# Patient Record
Sex: FEMALE | Race: WHITE | Employment: PART TIME | URBAN - METROPOLITAN AREA
[De-identification: names, ages, dates, MRNs, and addresses within clinical notes are randomized per-mention and may not be internally consistent; named-entity substitution may affect disease eponyms.]

---

## 2018-01-25 ENCOUNTER — HOSPITAL ENCOUNTER (OUTPATIENT)
Age: 38
Discharge: HOME | End: 2018-01-25
Payer: COMMERCIAL

## 2018-01-25 LAB
ALBUMIN SERPL-MCNC: 4.7 G/DL (ref 3.5–5.2)
ALP BLD-CCNC: 50 U/L (ref 35–104)
ALT SERPL-CCNC: 14 U/L (ref 5–33)
AST SERPL-CCNC: 23 U/L
C-REACTIVE PROTEIN: 2.1 MG/L (ref 0–5)
CALCIUM SERPL-MCNC: 9.5 MG/DL (ref 8.6–10.4)
CREAT SERPL-MCNC: 0.63 MG/DL (ref 0.5–0.9)
GFR AFRICAN AMERICAN: >60 ML/MIN
GFR NON-AFRICAN AMERICAN: >60 ML/MIN
GFR SERPL CREATININE-BSD FRML MDRD: NORMAL ML/MIN/{1.73_M2}
GFR SERPL CREATININE-BSD FRML MDRD: NORMAL ML/MIN/{1.73_M2}
HCT VFR BLD CALC: 45.1 % (ref 36–46)
HEMOGLOBIN: 15 G/DL (ref 12–16)
MCH RBC QN AUTO: 29.1 PG (ref 26–34)
MCHC RBC AUTO-ENTMCNC: 33.2 G/DL (ref 31–37)
MCV RBC AUTO: 87.7 FL (ref 80–100)
NRBC AUTOMATED: NORMAL PER 100 WBC
PDW BLD-RTO: 12.7 % (ref 11.5–14.5)
PLATELET # BLD: 322 K/UL (ref 130–400)
PMV BLD AUTO: 8.6 FL (ref 6–12)
RBC # BLD: 5.15 M/UL (ref 4–5.2)
SEDIMENTATION RATE, ERYTHROCYTE: 10 MM (ref 0–20)
WBC # BLD: 4.9 K/UL (ref 3.5–11)

## 2018-01-25 PROCEDURE — 85027 COMPLETE CBC AUTOMATED: CPT

## 2018-01-25 PROCEDURE — 85651 RBC SED RATE NONAUTOMATED: CPT

## 2018-01-25 PROCEDURE — 84075 ASSAY ALKALINE PHOSPHATASE: CPT

## 2018-01-25 PROCEDURE — 84450 TRANSFERASE (AST) (SGOT): CPT

## 2018-01-25 PROCEDURE — 84460 ALANINE AMINO (ALT) (SGPT): CPT

## 2018-01-25 PROCEDURE — 86140 C-REACTIVE PROTEIN: CPT

## 2018-01-25 PROCEDURE — 82310 ASSAY OF CALCIUM: CPT

## 2018-01-25 PROCEDURE — 82040 ASSAY OF SERUM ALBUMIN: CPT

## 2018-01-25 PROCEDURE — 82565 ASSAY OF CREATININE: CPT

## 2018-01-25 PROCEDURE — 36415 COLL VENOUS BLD VENIPUNCTURE: CPT

## 2018-02-08 ENCOUNTER — OFFICE VISIT (OUTPATIENT)
Dept: FAMILY MEDICINE CLINIC | Age: 38
End: 2018-02-08
Payer: COMMERCIAL

## 2018-02-08 VITALS
HEIGHT: 64 IN | BODY MASS INDEX: 21.89 KG/M2 | HEART RATE: 106 BPM | WEIGHT: 128.2 LBS | TEMPERATURE: 98.5 F | DIASTOLIC BLOOD PRESSURE: 70 MMHG | SYSTOLIC BLOOD PRESSURE: 128 MMHG

## 2018-02-08 DIAGNOSIS — Z11.4 SCREENING FOR HIV (HUMAN IMMUNODEFICIENCY VIRUS): ICD-10-CM

## 2018-02-08 DIAGNOSIS — Z23 NEED FOR INFLUENZA VACCINATION: ICD-10-CM

## 2018-02-08 DIAGNOSIS — Z76.89 ENCOUNTER TO ESTABLISH CARE: ICD-10-CM

## 2018-02-08 DIAGNOSIS — Z23 NEED FOR TDAP VACCINATION: ICD-10-CM

## 2018-02-08 DIAGNOSIS — L40.50 PSORIATIC ARTHRITIS (HCC): Primary | ICD-10-CM

## 2018-02-08 DIAGNOSIS — E78.5 DYSLIPIDEMIA: ICD-10-CM

## 2018-02-08 PROCEDURE — 99213 OFFICE O/P EST LOW 20 MIN: CPT | Performed by: FAMILY MEDICINE

## 2018-02-08 ASSESSMENT — ENCOUNTER SYMPTOMS
COLOR CHANGE: 0
ABDOMINAL PAIN: 0
COUGH: 0
VOMITING: 0
SHORTNESS OF BREATH: 0
DIARRHEA: 0
NAUSEA: 0
CONSTIPATION: 0
TROUBLE SWALLOWING: 0

## 2018-02-08 ASSESSMENT — PATIENT HEALTH QUESTIONNAIRE - PHQ9
1. LITTLE INTEREST OR PLEASURE IN DOING THINGS: 0
2. FEELING DOWN, DEPRESSED OR HOPELESS: 0
SUM OF ALL RESPONSES TO PHQ9 QUESTIONS 1 & 2: 0
SUM OF ALL RESPONSES TO PHQ QUESTIONS 1-9: 0

## 2018-02-08 NOTE — PROGRESS NOTES
Attending Physician Statement  I  have discussed the care of Sandy Chiu including pertinent history and exam findings with the resident. I agree with the assessment, plan and orders as documented by the resident. /70 (Site: Left Arm, Position: Sitting, Cuff Size: Medium Adult) Comment: Manual  Pulse 106   Temp 98.5 °F (36.9 °C) (Temporal)   Ht 5' 3.78\" (1.62 m)   Wt 128 lb 3.2 oz (58.2 kg)   LMP 01/18/2018 (Within Days)   BMI 22.16 kg/m²    BP Readings from Last 3 Encounters:   02/08/18 128/70     Wt Readings from Last 3 Encounters:   02/08/18 128 lb 3.2 oz (58.2 kg)         1. Psoriatic arthritis ([address]     2. Encounter to establish care     3. Need for Tdap vaccination  Tdap (age 6y and older) IM (Apportable Extension)   4. Need for influenza vaccination  INFLUENZA, QUADV, 6 MO AND OLDER, IM, MDV, 0.5ML (FLULAVAL QUADV)   5. Dyslipidemia  Lipid, Fasting   6. Screening for HIV (human immunodeficiency virus)  HIV Screen     Chart reviewed and discussed care plan with resident. Health maintenance addressed. Follow up in three months. Call results to patient.       Juan Hernández DO 2/8/2018 4:29 PM
virus)  - HIV Screen; Future      Ashwini Maier received counseling on the following healthy behaviors: nutrition, exercise and medication adherence  Reviewed prior labs and health maintenance  Continue current medications, diet and exercise. Discussed use, benefit, and side effects of prescribed medications. Barriers to medication compliance addressed. Patient given educational materials - see patient instructions  Was a self-tracking handout given in paper form or via [employer]? No    Requested Prescriptions      No prescriptions requested or ordered in this encounter       All patient questions answered. Patient voiced understanding. Quality Measures    Body mass index is 22.16 kg/m². Elevated. Weight control planned discussed Healthy diet and regular exercise. BP: 128/70 (Manual) Blood pressure is normal. Treatment plan consists of No treatment change needed. No results found for: LDLCALC, LDLCHOLESTEROL, LDLDIRECT (goal LDL reduction with dx if diabetes is 50% LDL reduction)      PHQ Scores 2/8/2018   PHQ2 Score 0   PHQ9 Score 0     Interpretation of Total Score Depression Severity: 1-4 = Minimal depression, 5-9 = Mild depression, 10-14 = Moderate depression, 15-19 = Moderately severe depression, 20-27 = Severe depression    Requested Prescriptions      No prescriptions requested or ordered in this encounter       There are no discontinued medications. Ashwini Maier received counseling on the following healthy behaviors: nutrition, exercise and medication adherence    Patient given educational materials : see patient instruction     Discussed use, benefit, and side effects of prescribed medications. Barriers to medication compliance addressed. All patient questions answered. Pt voiced understanding. Return in about 3 months (around 5/8/2018) for psoriatic arthritis . Please note that this chart was generated using voice recognition Dragon dictation software.   Although every effort

## 2018-02-09 ENCOUNTER — HOSPITAL ENCOUNTER (OUTPATIENT)
Age: 38
Discharge: HOME | End: 2018-02-09
Payer: COMMERCIAL

## 2018-02-09 ENCOUNTER — TELEPHONE (OUTPATIENT)
Dept: FAMILY MEDICINE CLINIC | Age: 38
End: 2018-02-09

## 2018-02-09 DIAGNOSIS — E78.5 DYSLIPIDEMIA: ICD-10-CM

## 2018-02-09 DIAGNOSIS — Z11.4 SCREENING FOR HIV (HUMAN IMMUNODEFICIENCY VIRUS): ICD-10-CM

## 2018-02-09 LAB
CHOLESTEROL, FASTING: 246 MG/DL
CHOLESTEROL/HDL RATIO: 3.3
HDLC SERPL-MCNC: 75 MG/DL
HIV AG/AB: NONREACTIVE
LDL CHOLESTEROL: 153 MG/DL (ref 0–130)
TRIGLYCERIDE, FASTING: 88 MG/DL
VLDLC SERPL CALC-MCNC: ABNORMAL MG/DL (ref 1–30)

## 2018-02-09 PROCEDURE — 87389 HIV-1 AG W/HIV-1&-2 AB AG IA: CPT

## 2018-02-09 PROCEDURE — 90715 TDAP VACCINE 7 YRS/> IM: CPT | Performed by: FAMILY MEDICINE

## 2018-02-09 PROCEDURE — 90471 IMMUNIZATION ADMIN: CPT | Performed by: FAMILY MEDICINE

## 2018-02-09 PROCEDURE — 36415 COLL VENOUS BLD VENIPUNCTURE: CPT

## 2018-02-09 PROCEDURE — 80061 LIPID PANEL: CPT

## 2018-02-09 PROCEDURE — 90688 IIV4 VACCINE SPLT 0.5 ML IM: CPT | Performed by: FAMILY MEDICINE

## 2018-02-09 PROCEDURE — 90472 IMMUNIZATION ADMIN EACH ADD: CPT | Performed by: FAMILY MEDICINE

## 2018-02-23 ENCOUNTER — HOSPITAL ENCOUNTER (OUTPATIENT)
Age: 38
Setting detail: SPECIMEN
Discharge: HOME | End: 2018-02-23
Payer: COMMERCIAL

## 2018-02-23 ENCOUNTER — OFFICE VISIT (OUTPATIENT)
Dept: OBGYN CLINIC | Age: 38
End: 2018-02-23
Payer: COMMERCIAL

## 2018-02-23 VITALS
HEART RATE: 115 BPM | BODY MASS INDEX: 22.5 KG/M2 | WEIGHT: 127 LBS | DIASTOLIC BLOOD PRESSURE: 77 MMHG | SYSTOLIC BLOOD PRESSURE: 131 MMHG | HEIGHT: 63 IN

## 2018-02-23 DIAGNOSIS — L40.50 PSORIATIC ARTHRITIS (HCC): ICD-10-CM

## 2018-02-23 DIAGNOSIS — Z01.419 WOMEN'S ANNUAL ROUTINE GYNECOLOGICAL EXAMINATION: Primary | ICD-10-CM

## 2018-02-23 DIAGNOSIS — Z31.69 ENCOUNTER FOR PRECONCEPTION CONSULTATION: ICD-10-CM

## 2018-02-23 PROCEDURE — 99385 PREV VISIT NEW AGE 18-39: CPT | Performed by: OBSTETRICS & GYNECOLOGY

## 2018-02-23 RX ORDER — ALBUTEROL SULFATE 90 UG/1
2 AEROSOL, METERED RESPIRATORY (INHALATION) EVERY 6 HOURS PRN
COMMUNITY
End: 2018-03-26 | Stop reason: ALTCHOICE

## 2018-02-23 ASSESSMENT — ENCOUNTER SYMPTOMS
PHOTOPHOBIA: 0
COUGH: 0
ABDOMINAL PAIN: 0
HEARTBURN: 0
BACK PAIN: 1
BLURRED VISION: 0
SHORTNESS OF BREATH: 0

## 2018-02-23 NOTE — PROGRESS NOTES
children: N/A    Years of education: N/A     Occupational History    Not on file. Social History Main Topics    Smoking status: Never Smoker    Smokeless tobacco: Never Used    Alcohol use No    Drug use: No    Sexual activity: Not Currently     Partners: Male     Other Topics Concern    Not on file     Social History Narrative    No narrative on file       Family History   Problem Relation Age of Onset    Heart Attack Mother     Diabetes Father     Diabetes Brother     Heart Disease Maternal Grandmother     Diabetes Maternal Grandmother     No Known Problems Maternal Grandfather     Heart Attack Paternal Grandmother     Heart Attack Paternal Grandfather        Review of Systems:   Review of Systems   Constitutional: Negative for chills and fever. HENT: Negative for congestion and hearing loss. Eyes: Negative for blurred vision and photophobia. Respiratory: Negative for cough and shortness of breath. Cardiovascular: Negative for chest pain and palpitations. Gastrointestinal: Negative for abdominal pain and heartburn. Genitourinary: Negative for dysuria and hematuria. Musculoskeletal: Positive for back pain and joint pain. Neurological: Negative for dizziness and headaches. Psychiatric/Behavioral: Negative for depression. The patient is not nervous/anxious. Physical exam:  vitals:  Height   5  ft    3 in,  Weight    127 lbs,   131/77 BP  Gen: alert, no apparent distress  HEENT: No pathologic skin lesions noted,NC/AT,PERRL, normal midline nontender thyroid   Lung Exam: Clear to auscultation in all fields bilaterally, without wheezes,rales or rhonchi. Cardiac Exam: Normal sinus rhythm and rate, without murmurs, rubs or gallops appreciated. Breast Exam: Symmetric without pathological skin changes, nontender without discrete suspicious masses palpated, supraclavicular or axillary adenopathy or nipple discharge noted.   Abdominal Exam: Nontender to deep palpation without

## 2018-02-27 LAB
HPV SAMPLE: NORMAL
HPV SOURCE: NORMAL
HPV, GENOTYPE 16: NOT DETECTED
HPV, GENOTYPE 18: NOT DETECTED
HPV, HIGH RISK OTHER: NOT DETECTED
HPV, INTERPRETATION: NORMAL

## 2018-03-02 LAB — CYTOLOGY REPORT: NORMAL

## 2018-03-26 ENCOUNTER — INITIAL CONSULT (OUTPATIENT)
Dept: PERINATAL CARE | Age: 38
End: 2018-03-26
Payer: COMMERCIAL

## 2018-03-26 VITALS
HEART RATE: 88 BPM | BODY MASS INDEX: 23.74 KG/M2 | RESPIRATION RATE: 16 BRPM | DIASTOLIC BLOOD PRESSURE: 74 MMHG | WEIGHT: 134 LBS | SYSTOLIC BLOOD PRESSURE: 120 MMHG | HEIGHT: 63 IN | TEMPERATURE: 97.8 F

## 2018-03-26 DIAGNOSIS — J45.909 ASTHMA AFFECTING PREGNANCY, ANTEPARTUM: ICD-10-CM

## 2018-03-26 DIAGNOSIS — O35.8XX0 SUSPECTED DAMAGE TO FETUS FROM DISEASE IN MOTHER, ANTEPARTUM CONDITION, SINGLE OR UNSPECIFIED FETUS: ICD-10-CM

## 2018-03-26 DIAGNOSIS — Z31.69 GENERAL COUNSELING AND ADVICE FOR PROCREATIVE MANAGEMENT: Primary | ICD-10-CM

## 2018-03-26 DIAGNOSIS — O09.519 ADVANCED MATERNAL AGE, PRIMIGRAVIDA, ANTEPARTUM: ICD-10-CM

## 2018-03-26 DIAGNOSIS — O99.519 ASTHMA AFFECTING PREGNANCY, ANTEPARTUM: ICD-10-CM

## 2018-03-26 PROCEDURE — 99242 OFF/OP CONSLTJ NEW/EST SF 20: CPT | Performed by: OBSTETRICS & GYNECOLOGY

## 2018-03-26 RX ORDER — MONTELUKAST SODIUM 10 MG/1
10 TABLET ORAL NIGHTLY
COMMUNITY
Start: 2017-01-26 | End: 2018-09-04 | Stop reason: SDUPTHER

## 2018-04-20 ENCOUNTER — HOSPITAL ENCOUNTER (OUTPATIENT)
Age: 38
Discharge: HOME | End: 2018-04-20
Payer: COMMERCIAL

## 2018-04-20 LAB
AMOUNT GLUCOSE GIVEN: 75 G
GLUCOSE FASTING: 94 MG/DL (ref 65–99)
GLUCOSE TOLERANCE TEST 1 HOUR: 104 MG/DL (ref 65–184)
GLUCOSE TOLERANCE TEST 2 HOUR: 105 MG/DL (ref 65–139)

## 2018-04-20 PROCEDURE — 82951 GLUCOSE TOLERANCE TEST (GTT): CPT

## 2018-04-20 PROCEDURE — 36415 COLL VENOUS BLD VENIPUNCTURE: CPT

## 2018-05-31 ENCOUNTER — HOSPITAL ENCOUNTER (OUTPATIENT)
Age: 38
Discharge: HOME | End: 2018-05-31
Payer: COMMERCIAL

## 2018-05-31 LAB
ALBUMIN SERPL-MCNC: 4.4 G/DL (ref 3.5–5.2)
ALP BLD-CCNC: 54 U/L (ref 35–104)
ALT SERPL-CCNC: 11 U/L (ref 5–33)
AST SERPL-CCNC: 19 U/L
C-REACTIVE PROTEIN: 3.8 MG/L (ref 0–5)
CALCIUM SERPL-MCNC: 9.3 MG/DL (ref 8.6–10.4)
CREAT SERPL-MCNC: 0.71 MG/DL (ref 0.5–0.9)
GFR AFRICAN AMERICAN: >60 ML/MIN
GFR NON-AFRICAN AMERICAN: >60 ML/MIN
GFR SERPL CREATININE-BSD FRML MDRD: NORMAL ML/MIN/{1.73_M2}
GFR SERPL CREATININE-BSD FRML MDRD: NORMAL ML/MIN/{1.73_M2}
HCT VFR BLD CALC: 43.9 % (ref 36–46)
HEMOGLOBIN: 14.9 G/DL (ref 12–16)
MCH RBC QN AUTO: 29.6 PG (ref 26–34)
MCHC RBC AUTO-ENTMCNC: 34 G/DL (ref 31–37)
MCV RBC AUTO: 87.2 FL (ref 80–100)
NRBC AUTOMATED: NORMAL PER 100 WBC
PDW BLD-RTO: 12 % (ref 11.5–14.5)
PLATELET # BLD: 314 K/UL (ref 130–400)
PMV BLD AUTO: NORMAL FL (ref 6–12)
RBC # BLD: 5.04 M/UL (ref 4–5.2)
SEDIMENTATION RATE, ERYTHROCYTE: 16 MM (ref 0–20)
WBC # BLD: 6.2 K/UL (ref 3.5–11)

## 2018-05-31 PROCEDURE — 82565 ASSAY OF CREATININE: CPT

## 2018-05-31 PROCEDURE — 84460 ALANINE AMINO (ALT) (SGPT): CPT

## 2018-05-31 PROCEDURE — 84450 TRANSFERASE (AST) (SGOT): CPT

## 2018-05-31 PROCEDURE — 86140 C-REACTIVE PROTEIN: CPT

## 2018-05-31 PROCEDURE — 84075 ASSAY ALKALINE PHOSPHATASE: CPT

## 2018-05-31 PROCEDURE — 85027 COMPLETE CBC AUTOMATED: CPT

## 2018-05-31 PROCEDURE — 85651 RBC SED RATE NONAUTOMATED: CPT

## 2018-05-31 PROCEDURE — 36415 COLL VENOUS BLD VENIPUNCTURE: CPT

## 2018-05-31 PROCEDURE — 82310 ASSAY OF CALCIUM: CPT

## 2018-05-31 PROCEDURE — 82040 ASSAY OF SERUM ALBUMIN: CPT

## 2018-06-18 ENCOUNTER — OFFICE VISIT (OUTPATIENT)
Dept: FAMILY MEDICINE CLINIC | Age: 38
End: 2018-06-18
Payer: COMMERCIAL

## 2018-06-18 VITALS
BODY MASS INDEX: 23.6 KG/M2 | WEIGHT: 133.2 LBS | TEMPERATURE: 98.1 F | HEIGHT: 63 IN | SYSTOLIC BLOOD PRESSURE: 126 MMHG | DIASTOLIC BLOOD PRESSURE: 87 MMHG | HEART RATE: 87 BPM

## 2018-06-18 DIAGNOSIS — Z02.0 SCHOOL PHYSICAL EXAM: Primary | ICD-10-CM

## 2018-06-18 DIAGNOSIS — J45.909 UNCOMPLICATED ASTHMA, UNSPECIFIED ASTHMA SEVERITY, UNSPECIFIED WHETHER PERSISTENT: ICD-10-CM

## 2018-06-18 DIAGNOSIS — Z23 NEED FOR PNEUMOCOCCAL VACCINATION: ICD-10-CM

## 2018-06-18 PROCEDURE — 99213 OFFICE O/P EST LOW 20 MIN: CPT | Performed by: FAMILY MEDICINE

## 2018-06-18 PROCEDURE — 90732 PPSV23 VACC 2 YRS+ SUBQ/IM: CPT | Performed by: FAMILY MEDICINE

## 2018-06-18 RX ORDER — ALBUTEROL SULFATE 90 UG/1
1 AEROSOL, METERED RESPIRATORY (INHALATION) 2 TIMES DAILY
Qty: 1 INHALER | Refills: 3 | Status: SHIPPED | OUTPATIENT
Start: 2018-06-18 | End: 2019-02-27 | Stop reason: SDUPTHER

## 2018-06-18 ASSESSMENT — ENCOUNTER SYMPTOMS
DIARRHEA: 0
TROUBLE SWALLOWING: 0
SHORTNESS OF BREATH: 0
VOMITING: 0
NAUSEA: 0
COUGH: 0
COLOR CHANGE: 0
CONSTIPATION: 0
ABDOMINAL PAIN: 0

## 2018-06-19 ENCOUNTER — HOSPITAL ENCOUNTER (OUTPATIENT)
Age: 38
Discharge: HOME | End: 2018-06-19
Payer: COMMERCIAL

## 2018-06-19 DIAGNOSIS — Z02.0 SCHOOL PHYSICAL EXAM: ICD-10-CM

## 2018-06-19 LAB
HBV SURFACE AB TITR SER: 9.76 MIU/ML
RUBV IGG SER QL: >500 IU/ML

## 2018-06-19 PROCEDURE — 86765 RUBEOLA ANTIBODY: CPT

## 2018-06-19 PROCEDURE — 86762 RUBELLA ANTIBODY: CPT

## 2018-06-19 PROCEDURE — 86480 TB TEST CELL IMMUN MEASURE: CPT

## 2018-06-19 PROCEDURE — 86735 MUMPS ANTIBODY: CPT

## 2018-06-19 PROCEDURE — 86317 IMMUNOASSAY INFECTIOUS AGENT: CPT

## 2018-06-19 PROCEDURE — 36415 COLL VENOUS BLD VENIPUNCTURE: CPT

## 2018-06-19 PROCEDURE — 86787 VARICELLA-ZOSTER ANTIBODY: CPT

## 2018-06-21 LAB
QUANTIFERON (R) TB GOLD (INCUBATED): NEGATIVE
QUANTIFERON MITOGEN: >10 IU/ML
QUANTIFERON NIL: 0.02 IU/ML
QUANTIFERON TB AG MINUS NIL: 0 IU/ML (ref 0–0.34)

## 2018-06-22 LAB
MEASLES IMMUNE (IGG): 1.97
MUV IGG SER QL: 0.88
VZV IGG SER QL IA: 1.35

## 2018-06-25 ENCOUNTER — TELEPHONE (OUTPATIENT)
Dept: ADMINISTRATIVE | Age: 38
End: 2018-06-25

## 2018-07-09 ENCOUNTER — NURSE ONLY (OUTPATIENT)
Dept: FAMILY MEDICINE CLINIC | Age: 38
End: 2018-07-09
Payer: COMMERCIAL

## 2018-07-09 DIAGNOSIS — Z23 IMMUNIZATION DUE: Primary | ICD-10-CM

## 2018-07-09 PROCEDURE — 90746 HEPB VACCINE 3 DOSE ADULT IM: CPT | Performed by: FAMILY MEDICINE

## 2018-07-09 NOTE — PROGRESS NOTES
Patient came to office to get mmr and hep b. I administered hep b in right arm. I did not give mmr due to the patient did not have proof from rheum doctor that she can have it. Patient said she will get proof and come back to office.

## 2018-07-11 ENCOUNTER — TELEPHONE (OUTPATIENT)
Dept: ADMINISTRATIVE | Age: 38
End: 2018-07-11

## 2018-07-11 NOTE — TELEPHONE ENCOUNTER
Writer spoke with patient , Clearance was received , scanned into pts chart.  Pt made an appointment to received MMR vaccine on 07/16/2018 @3:45pm.

## 2018-07-11 NOTE — TELEPHONE ENCOUNTER
Patient came into have vaccinations done, but she states they weren't done because office needed approval from patient's rheumatologist, Dr. Franky Mace. Patient wants to know if you have received approval so she can get rescheduled for her vaccinations. She states Dr. Sonia Naylor office was supposed to have sent approval already. Please call pt back, she is waiting on return call so she can schedule appt.

## 2018-07-16 ENCOUNTER — NURSE ONLY (OUTPATIENT)
Dept: FAMILY MEDICINE CLINIC | Age: 38
End: 2018-07-16
Payer: COMMERCIAL

## 2018-07-16 DIAGNOSIS — Z23 NEED FOR MMR VACCINE: Primary | ICD-10-CM

## 2018-07-16 PROCEDURE — 90707 MMR VACCINE SC: CPT | Performed by: FAMILY MEDICINE

## 2018-07-16 PROCEDURE — 99211 OFF/OP EST MAY X REQ PHY/QHP: CPT | Performed by: FAMILY MEDICINE

## 2018-07-16 NOTE — PROGRESS NOTES
Pt here today for MMR vaccine Pt tolerate well, injection of the LT arm Pt was informed that record of vaccination and paperwork will be faxed to the appropriate office.

## 2018-08-06 ENCOUNTER — OFFICE VISIT (OUTPATIENT)
Dept: FAMILY MEDICINE CLINIC | Age: 38
End: 2018-08-06
Payer: COMMERCIAL

## 2018-08-06 VITALS
TEMPERATURE: 97.5 F | HEIGHT: 63 IN | DIASTOLIC BLOOD PRESSURE: 78 MMHG | HEART RATE: 95 BPM | WEIGHT: 136.6 LBS | BODY MASS INDEX: 24.2 KG/M2 | SYSTOLIC BLOOD PRESSURE: 128 MMHG

## 2018-08-06 DIAGNOSIS — J01.00 ACUTE NON-RECURRENT MAXILLARY SINUSITIS: Primary | ICD-10-CM

## 2018-08-06 PROCEDURE — 99213 OFFICE O/P EST LOW 20 MIN: CPT | Performed by: HOSPITALIST

## 2018-08-06 RX ORDER — AZITHROMYCIN 250 MG/1
TABLET, FILM COATED ORAL
Qty: 1 PACKET | Refills: 0 | Status: SHIPPED | OUTPATIENT
Start: 2018-08-06 | End: 2018-08-10

## 2018-08-06 ASSESSMENT — ENCOUNTER SYMPTOMS
SORE THROAT: 1
VOMITING: 0
COUGH: 0
SINUS PRESSURE: 1
WHEEZING: 0
SHORTNESS OF BREATH: 0
HOARSE VOICE: 1
NAUSEA: 0

## 2018-08-06 ASSESSMENT — PATIENT HEALTH QUESTIONNAIRE - PHQ9
1. LITTLE INTEREST OR PLEASURE IN DOING THINGS: 0
2. FEELING DOWN, DEPRESSED OR HOPELESS: 0
SUM OF ALL RESPONSES TO PHQ QUESTIONS 1-9: 0
SUM OF ALL RESPONSES TO PHQ9 QUESTIONS 1 & 2: 0

## 2018-08-06 NOTE — PATIENT INSTRUCTIONS
Thank you for letting us take care of you today. We hope all your questions were addressed. If a question was overlooked or something else comes to mind after you return home, please contact a member of your Care Team listed below. Please make sure you have a routine office visit set up to follow-up on [address]. Your Care Team at [place] [address] is Team #2  Aurelio Doherty DO (Faculty)  Tiffanie Bear MD (Resident)  Serena Martinez MD (Resident)  James Cuello MD (Resident)  Moon Lowe MD (Resident)  Ki Duarte MD (Resident)  Stefanie Rubio, LPN  Anayeli Force., Evelyn Summerssusan, [address] ([facility])  Madyson Isaacs RN, ([address] [facility])  Karolina Hoskins, Ph.D., (Behavioral Services)  Warrenjimmy Norm, [address] [place] [address] (Clinical Pharmacist)     Office phone number: [phone]    If you need to get in right away due to illness, please be advised we have \"Same Day\" appointments available Monday-Friday. Please call us at [phone] option #3 to schedule your \"Same Day\" appointment. Patient Education        Sinusitis: Care Instructions  Your Care Instructions    Sinusitis is an infection of the lining of the sinus cavities in your head. Sinusitis often follows a cold. It causes pain and pressure in your head and face. In most cases, sinusitis gets better on its own in 1 to 2 weeks. But some mild symptoms may last for several weeks. Sometimes antibiotics are needed. Follow-up care is a key part of your treatment and safety. Be sure to make and go to all appointments, and call your doctor if you are having problems. It's also a good idea to know your test results and keep a list of the medicines you take. How can you care for yourself at home? · Take an over-the-counter pain medicine, such as acetaminophen (Tylenol), ibuprofen (Advil, Motrin), or naproxen (Aleve). Read and follow all instructions on the label. · If the doctor prescribed antibiotics, take them as directed.

## 2018-08-13 ENCOUNTER — NURSE ONLY (OUTPATIENT)
Dept: FAMILY MEDICINE CLINIC | Age: 38
End: 2018-08-13
Payer: COMMERCIAL

## 2018-08-13 DIAGNOSIS — Z23 NEED FOR VACCINATION: Primary | ICD-10-CM

## 2018-08-13 PROCEDURE — G0010 ADMIN HEPATITIS B VACCINE: HCPCS

## 2018-08-13 PROCEDURE — 90707 MMR VACCINE SC: CPT

## 2018-08-13 NOTE — PROGRESS NOTES
Patient here today for nurse visit for MMR immunization. Administered injection in right deltoid. Patient tolerated procedure well. VIS given. Patient here today for nurse visit for Hepatitis B injection #2 of 3. Administered injection in right deltoid. Patient tolerated procedure well. VIS given.   To follow up in 6 months for Hepatitis B injection #3

## 2018-09-04 RX ORDER — MONTELUKAST SODIUM 10 MG/1
10 TABLET ORAL NIGHTLY
Qty: 30 TABLET | Refills: 3 | Status: SHIPPED | OUTPATIENT
Start: 2018-09-04 | End: 2019-07-22 | Stop reason: SDUPTHER

## 2018-09-04 NOTE — TELEPHONE ENCOUNTER
Please refill [facility]          Next Visit Date:  No future appointments.     Health Maintenance   Topic Date Due    Flu vaccine (1) 09/01/2018    Cervical cancer screen  02/23/2023    DTaP/Tdap/Td vaccine (2 - Td) 02/09/2028    Pneumococcal med risk  Completed    HIV screen  Completed       No results found for: LABA1C          ( goal A1C is < 7)   No results found for: LABMICR  LDL Cholesterol (mg/dL)   Date Value   02/09/2018 153 (H)       (goal LDL is <100)   AST (U/L)   Date Value   05/31/2018 19     ALT (U/L)   Date Value   05/31/2018 11     BP Readings from Last 3 Encounters:   08/06/18 128/78   06/18/18 126/87   03/26/18 120/74          (goal 120/80)    All Future Testing planned in [facility]  Lab Frequency Next Occurrence   PAP Smear Once 03/23/2018               Patient Active Problem List:     General counseling and advice for procreative management     Asthma affecting pregnancy, antepartum     Suspected damage to fetus from other disease in mother, affecting management of mother, antepartum condition or complication     Advanced maternal age, primigravida, antepartum

## 2018-09-21 ENCOUNTER — HOSPITAL ENCOUNTER (OUTPATIENT)
Age: 38
Discharge: HOME | End: 2018-09-21
Payer: COMMERCIAL

## 2018-09-21 LAB
ABSOLUTE EOS #: 0.5 K/UL (ref 0–0.4)
ABSOLUTE IMMATURE GRANULOCYTE: ABNORMAL K/UL (ref 0–0.3)
ABSOLUTE LYMPH #: 1.6 K/UL (ref 1–4.8)
ABSOLUTE MONO #: 0.6 K/UL (ref 0.2–0.8)
ALBUMIN SERPL-MCNC: 4.2 G/DL (ref 3.5–5.2)
ALP BLD-CCNC: 53 U/L (ref 35–104)
ALT SERPL-CCNC: 12 U/L (ref 5–33)
AST SERPL-CCNC: 24 U/L
BASOPHILS # BLD: 1 % (ref 0–2)
BASOPHILS ABSOLUTE: 0.1 K/UL (ref 0–0.2)
C-REACTIVE PROTEIN: 4.2 MG/L (ref 0–5)
CALCIUM SERPL-MCNC: 9.2 MG/DL (ref 8.6–10.4)
CREAT SERPL-MCNC: 0.62 MG/DL (ref 0.5–0.9)
DIFFERENTIAL TYPE: ABNORMAL
EOSINOPHILS RELATIVE PERCENT: 7 % (ref 1–4)
GFR AFRICAN AMERICAN: >60 ML/MIN
GFR NON-AFRICAN AMERICAN: >60 ML/MIN
GFR SERPL CREATININE-BSD FRML MDRD: NORMAL ML/MIN/{1.73_M2}
GFR SERPL CREATININE-BSD FRML MDRD: NORMAL ML/MIN/{1.73_M2}
HCT VFR BLD CALC: 43.9 % (ref 36–46)
HEMOGLOBIN: 14.4 G/DL (ref 12–16)
IMMATURE GRANULOCYTES: ABNORMAL %
LYMPHOCYTES # BLD: 24 % (ref 24–44)
MCH RBC QN AUTO: 28.5 PG (ref 26–34)
MCHC RBC AUTO-ENTMCNC: 32.8 G/DL (ref 31–37)
MCV RBC AUTO: 86.7 FL (ref 80–100)
MONOCYTES # BLD: 9 % (ref 1–7)
NRBC AUTOMATED: ABNORMAL PER 100 WBC
PDW BLD-RTO: 12.2 % (ref 11.5–14.5)
PLATELET # BLD: 326 K/UL (ref 130–400)
PLATELET ESTIMATE: ABNORMAL
PMV BLD AUTO: ABNORMAL FL (ref 6–12)
RBC # BLD: 5.07 M/UL (ref 4–5.2)
RBC # BLD: ABNORMAL 10*6/UL
SEDIMENTATION RATE, ERYTHROCYTE: 20 MM (ref 0–20)
SEG NEUTROPHILS: 59 % (ref 36–66)
SEGMENTED NEUTROPHILS ABSOLUTE COUNT: 3.8 K/UL (ref 1.8–7.7)
WBC # BLD: 6.6 K/UL (ref 3.5–11)
WBC # BLD: ABNORMAL 10*3/UL

## 2018-09-21 PROCEDURE — 36415 COLL VENOUS BLD VENIPUNCTURE: CPT

## 2018-09-21 PROCEDURE — 86140 C-REACTIVE PROTEIN: CPT

## 2018-09-21 PROCEDURE — 85025 COMPLETE CBC W/AUTO DIFF WBC: CPT

## 2018-09-21 PROCEDURE — 84460 ALANINE AMINO (ALT) (SGPT): CPT

## 2018-09-21 PROCEDURE — 84450 TRANSFERASE (AST) (SGOT): CPT

## 2018-09-21 PROCEDURE — 85651 RBC SED RATE NONAUTOMATED: CPT

## 2018-09-21 PROCEDURE — 82310 ASSAY OF CALCIUM: CPT

## 2018-09-21 PROCEDURE — 82565 ASSAY OF CREATININE: CPT

## 2018-09-21 PROCEDURE — 84075 ASSAY ALKALINE PHOSPHATASE: CPT

## 2018-09-21 PROCEDURE — 82040 ASSAY OF SERUM ALBUMIN: CPT

## 2018-11-20 ENCOUNTER — HOSPITAL ENCOUNTER (OUTPATIENT)
Age: 38
Discharge: HOME | End: 2018-11-20
Payer: COMMERCIAL

## 2018-11-20 ENCOUNTER — OFFICE VISIT (OUTPATIENT)
Dept: FAMILY MEDICINE CLINIC | Age: 38
End: 2018-11-20
Payer: COMMERCIAL

## 2018-11-20 VITALS
HEART RATE: 60 BPM | BODY MASS INDEX: 24.98 KG/M2 | DIASTOLIC BLOOD PRESSURE: 80 MMHG | TEMPERATURE: 97.4 F | SYSTOLIC BLOOD PRESSURE: 110 MMHG | WEIGHT: 141 LBS

## 2018-11-20 DIAGNOSIS — J06.9 VIRAL URI: Primary | ICD-10-CM

## 2018-11-20 DIAGNOSIS — R06.02 SOB (SHORTNESS OF BREATH): ICD-10-CM

## 2018-11-20 DIAGNOSIS — R06.2 WHEEZING: ICD-10-CM

## 2018-11-20 LAB
ABSOLUTE EOS #: 0.73 K/UL (ref 0–0.44)
ABSOLUTE IMMATURE GRANULOCYTE: 0.03 K/UL (ref 0–0.3)
ABSOLUTE LYMPH #: 1.18 K/UL (ref 1.1–3.7)
ABSOLUTE MONO #: 1.15 K/UL (ref 0.1–1.2)
ALBUMIN SERPL-MCNC: 4.4 G/DL (ref 3.5–5.2)
ALP BLD-CCNC: 64 U/L (ref 35–104)
ALT SERPL-CCNC: 13 U/L (ref 5–33)
AST SERPL-CCNC: 24 U/L
BASOPHILS # BLD: 1 % (ref 0–2)
BASOPHILS ABSOLUTE: 0.07 K/UL (ref 0–0.2)
C-REACTIVE PROTEIN: 15.4 MG/L (ref 0–5)
CALCIUM SERPL-MCNC: 9.1 MG/DL (ref 8.6–10.4)
CREAT SERPL-MCNC: 0.75 MG/DL (ref 0.5–0.9)
DIFFERENTIAL TYPE: ABNORMAL
EOSINOPHILS RELATIVE PERCENT: 11 % (ref 1–4)
GFR AFRICAN AMERICAN: >60 ML/MIN
GFR NON-AFRICAN AMERICAN: >60 ML/MIN
GFR SERPL CREATININE-BSD FRML MDRD: NORMAL ML/MIN/{1.73_M2}
GFR SERPL CREATININE-BSD FRML MDRD: NORMAL ML/MIN/{1.73_M2}
HCT VFR BLD CALC: 45.4 % (ref 36.3–47.1)
HEMOGLOBIN: 14.8 G/DL (ref 11.9–15.1)
IMMATURE GRANULOCYTES: 0 %
LYMPHOCYTES # BLD: 18 % (ref 24–43)
MCH RBC QN AUTO: 28.8 PG (ref 25.2–33.5)
MCHC RBC AUTO-ENTMCNC: 32.6 G/DL (ref 28.4–34.8)
MCV RBC AUTO: 88.3 FL (ref 82.6–102.9)
MONOCYTES # BLD: 17 % (ref 3–12)
NRBC AUTOMATED: 0 PER 100 WBC
PDW BLD-RTO: 12.5 % (ref 11.8–14.4)
PLATELET # BLD: 281 K/UL (ref 138–453)
PLATELET ESTIMATE: ABNORMAL
PMV BLD AUTO: 11.2 FL (ref 8.1–13.5)
RBC # BLD: 5.14 M/UL (ref 3.95–5.11)
RBC # BLD: ABNORMAL 10*6/UL
SEDIMENTATION RATE, ERYTHROCYTE: 20 MM (ref 0–20)
SEG NEUTROPHILS: 53 % (ref 36–65)
SEGMENTED NEUTROPHILS ABSOLUTE COUNT: 3.54 K/UL (ref 1.5–8.1)
WBC # BLD: 6.7 K/UL (ref 3.5–11.3)
WBC # BLD: ABNORMAL 10*3/UL

## 2018-11-20 PROCEDURE — 82565 ASSAY OF CREATININE: CPT

## 2018-11-20 PROCEDURE — 36415 COLL VENOUS BLD VENIPUNCTURE: CPT

## 2018-11-20 PROCEDURE — 82310 ASSAY OF CALCIUM: CPT

## 2018-11-20 PROCEDURE — 84460 ALANINE AMINO (ALT) (SGPT): CPT

## 2018-11-20 PROCEDURE — 82040 ASSAY OF SERUM ALBUMIN: CPT

## 2018-11-20 PROCEDURE — 84075 ASSAY ALKALINE PHOSPHATASE: CPT

## 2018-11-20 PROCEDURE — 99213 OFFICE O/P EST LOW 20 MIN: CPT | Performed by: STUDENT IN AN ORGANIZED HEALTH CARE EDUCATION/TRAINING PROGRAM

## 2018-11-20 PROCEDURE — 85025 COMPLETE CBC W/AUTO DIFF WBC: CPT

## 2018-11-20 PROCEDURE — 86140 C-REACTIVE PROTEIN: CPT

## 2018-11-20 PROCEDURE — 85651 RBC SED RATE NONAUTOMATED: CPT

## 2018-11-20 PROCEDURE — 84450 TRANSFERASE (AST) (SGOT): CPT

## 2018-11-20 RX ORDER — ECHINACEA PURPUREA EXTRACT 125 MG
1 TABLET ORAL PRN
Qty: 1 BOTTLE | Refills: 3 | Status: SHIPPED | OUTPATIENT
Start: 2018-11-20 | End: 2019-01-10 | Stop reason: ALTCHOICE

## 2018-11-20 ASSESSMENT — ENCOUNTER SYMPTOMS
CHEST TIGHTNESS: 1
WHEEZING: 1
SHORTNESS OF BREATH: 1
CONSTIPATION: 0
NAUSEA: 0
CHOKING: 0
ABDOMINAL PAIN: 0
COUGH: 1
STRIDOR: 0
APNEA: 0
DIARRHEA: 0

## 2018-11-20 NOTE — PROGRESS NOTES
Attending Physician Statement    Wt Readings from Last 3 Encounters:   11/20/18 141 lb (64 kg)   08/06/18 136 lb 9.6 oz (62 kg)   06/18/18 133 lb 3.2 oz (60.4 kg)     Temp Readings from Last 3 Encounters:   11/20/18 97.4 °F (36.3 °C) (Oral)   08/06/18 97.5 °F (36.4 °C)   06/18/18 98.1 °F (36.7 °C) (Temporal)     BP Readings from Last 3 Encounters:   11/20/18 110/80   08/06/18 128/78   06/18/18 126/87     Pulse Readings from Last 3 Encounters:   11/20/18 60   08/06/18 95   06/18/18 87         I have discussed the care of Jennifer Chacon, including pertinent history and exam findings,  with the resident. I have reviewed the key elements of all parts of the encounter with the resident. I agree with the assessment, plan and orders as documented by the resident.   (GE Modifier)

## 2018-12-06 ENCOUNTER — HOSPITAL ENCOUNTER (OUTPATIENT)
Dept: PULMONOLOGY | Age: 38
Discharge: HOME | End: 2018-12-06
Payer: COMMERCIAL

## 2018-12-06 DIAGNOSIS — R06.2 WHEEZING: ICD-10-CM

## 2018-12-06 DIAGNOSIS — R06.02 SOB (SHORTNESS OF BREATH): ICD-10-CM

## 2018-12-06 PROCEDURE — 94664 DEMO&/EVAL PT USE INHALER: CPT

## 2018-12-06 PROCEDURE — 94060 EVALUATION OF WHEEZING: CPT

## 2018-12-06 PROCEDURE — 94726 PLETHYSMOGRAPHY LUNG VOLUMES: CPT

## 2018-12-06 PROCEDURE — 94640 AIRWAY INHALATION TREATMENT: CPT

## 2018-12-06 PROCEDURE — 94250 HC DIFFUSION: CPT

## 2018-12-06 PROCEDURE — 94727 GAS DIL/WSHOT DETER LNG VOL: CPT

## 2018-12-14 ENCOUNTER — TELEPHONE (OUTPATIENT)
Dept: FAMILY MEDICINE CLINIC | Age: 38
End: 2018-12-14

## 2019-01-07 ENCOUNTER — TELEPHONE (OUTPATIENT)
Dept: PHARMACY | Age: 39
End: 2019-01-07

## 2019-01-10 ENCOUNTER — NURSE ONLY (OUTPATIENT)
Dept: FAMILY MEDICINE CLINIC | Age: 39
End: 2019-01-10
Payer: COMMERCIAL

## 2019-01-10 ENCOUNTER — TELEPHONE (OUTPATIENT)
Dept: PHARMACY | Age: 39
End: 2019-01-10

## 2019-01-10 DIAGNOSIS — Z23 NEED FOR HEPATITIS B VACCINATION: Primary | ICD-10-CM

## 2019-01-10 PROCEDURE — 90471 IMMUNIZATION ADMIN: CPT | Performed by: FAMILY MEDICINE

## 2019-01-10 PROCEDURE — 90746 HEPB VACCINE 3 DOSE ADULT IM: CPT | Performed by: FAMILY MEDICINE

## 2019-01-10 RX ORDER — CELECOXIB 200 MG/1
200 CAPSULE ORAL 2 TIMES DAILY PRN
COMMUNITY

## 2019-01-18 ENCOUNTER — OFFICE VISIT (OUTPATIENT)
Dept: INTERNAL MEDICINE | Age: 39
End: 2019-01-18
Payer: COMMERCIAL

## 2019-01-18 ENCOUNTER — HOSPITAL ENCOUNTER (OUTPATIENT)
Age: 39
Discharge: HOME | End: 2019-01-18
Payer: COMMERCIAL

## 2019-01-18 VITALS
DIASTOLIC BLOOD PRESSURE: 78 MMHG | WEIGHT: 147 LBS | HEIGHT: 63 IN | BODY MASS INDEX: 26.05 KG/M2 | HEART RATE: 77 BPM | SYSTOLIC BLOOD PRESSURE: 120 MMHG

## 2019-01-18 DIAGNOSIS — L40.50 PSORIATIC ARTHRITIS (HCC): ICD-10-CM

## 2019-01-18 LAB
ABSOLUTE EOS #: 0.41 K/UL (ref 0–0.44)
ABSOLUTE IMMATURE GRANULOCYTE: 0.03 K/UL (ref 0–0.3)
ABSOLUTE LYMPH #: 1.55 K/UL (ref 1.1–3.7)
ABSOLUTE MONO #: 0.47 K/UL (ref 0.1–1.2)
ALBUMIN SERPL-MCNC: 4.2 G/DL (ref 3.5–5.2)
ALP BLD-CCNC: 56 U/L (ref 35–104)
ALT SERPL-CCNC: 11 U/L (ref 5–33)
AST SERPL-CCNC: 20 U/L
BASOPHILS # BLD: 1 % (ref 0–2)
BASOPHILS ABSOLUTE: 0.04 K/UL (ref 0–0.2)
C-REACTIVE PROTEIN: 7.7 MG/L (ref 0–5)
CALCIUM SERPL-MCNC: 9 MG/DL (ref 8.6–10.4)
CREAT SERPL-MCNC: 0.81 MG/DL (ref 0.5–0.9)
DIFFERENTIAL TYPE: ABNORMAL
EOSINOPHILS RELATIVE PERCENT: 7 % (ref 1–4)
GFR AFRICAN AMERICAN: >60 ML/MIN
GFR NON-AFRICAN AMERICAN: >60 ML/MIN
GFR SERPL CREATININE-BSD FRML MDRD: NORMAL ML/MIN/{1.73_M2}
GFR SERPL CREATININE-BSD FRML MDRD: NORMAL ML/MIN/{1.73_M2}
HCT VFR BLD CALC: 42.8 % (ref 36.3–47.1)
HEMOGLOBIN: 14.4 G/DL (ref 11.9–15.1)
IMMATURE GRANULOCYTES: 1 %
LYMPHOCYTES # BLD: 27 % (ref 24–43)
MCH RBC QN AUTO: 29.5 PG (ref 25.2–33.5)
MCHC RBC AUTO-ENTMCNC: 33.6 G/DL (ref 28.4–34.8)
MCV RBC AUTO: 87.7 FL (ref 82.6–102.9)
MONOCYTES # BLD: 8 % (ref 3–12)
NRBC AUTOMATED: 0 PER 100 WBC
PDW BLD-RTO: 12.2 % (ref 11.8–14.4)
PLATELET # BLD: 319 K/UL (ref 138–453)
PLATELET ESTIMATE: ABNORMAL
PMV BLD AUTO: 11 FL (ref 8.1–13.5)
RBC # BLD: 4.88 M/UL (ref 3.95–5.11)
RBC # BLD: ABNORMAL 10*6/UL
SEDIMENTATION RATE, ERYTHROCYTE: 30 MM (ref 0–20)
SEG NEUTROPHILS: 56 % (ref 36–65)
SEGMENTED NEUTROPHILS ABSOLUTE COUNT: 3.21 K/UL (ref 1.5–8.1)
WBC # BLD: 5.7 K/UL (ref 3.5–11.3)
WBC # BLD: ABNORMAL 10*3/UL

## 2019-01-18 PROCEDURE — 99211 OFF/OP EST MAY X REQ PHY/QHP: CPT | Performed by: INTERNAL MEDICINE

## 2019-01-18 PROCEDURE — PHARMNEW PHARMACY SPECIALTY VISIT NEW: Performed by: INTERNAL MEDICINE

## 2019-01-18 PROCEDURE — 84460 ALANINE AMINO (ALT) (SGPT): CPT

## 2019-01-18 PROCEDURE — 36415 COLL VENOUS BLD VENIPUNCTURE: CPT

## 2019-01-18 PROCEDURE — 82310 ASSAY OF CALCIUM: CPT

## 2019-01-18 PROCEDURE — 85025 COMPLETE CBC W/AUTO DIFF WBC: CPT

## 2019-01-18 PROCEDURE — 82040 ASSAY OF SERUM ALBUMIN: CPT

## 2019-01-18 PROCEDURE — 84075 ASSAY ALKALINE PHOSPHATASE: CPT

## 2019-01-18 PROCEDURE — 82565 ASSAY OF CREATININE: CPT

## 2019-01-18 PROCEDURE — 85651 RBC SED RATE NONAUTOMATED: CPT

## 2019-01-18 PROCEDURE — 86140 C-REACTIVE PROTEIN: CPT

## 2019-01-18 PROCEDURE — 84450 TRANSFERASE (AST) (SGOT): CPT

## 2019-01-21 ENCOUNTER — OFFICE VISIT (OUTPATIENT)
Dept: FAMILY MEDICINE CLINIC | Age: 39
End: 2019-01-21
Payer: COMMERCIAL

## 2019-01-21 VITALS
TEMPERATURE: 97 F | HEART RATE: 98 BPM | BODY MASS INDEX: 25.91 KG/M2 | HEIGHT: 63 IN | SYSTOLIC BLOOD PRESSURE: 121 MMHG | DIASTOLIC BLOOD PRESSURE: 80 MMHG | WEIGHT: 146.2 LBS

## 2019-01-21 DIAGNOSIS — J30.9 ALLERGIC RHINITIS, UNSPECIFIED SEASONALITY, UNSPECIFIED TRIGGER: ICD-10-CM

## 2019-01-21 DIAGNOSIS — R05.9 COUGH: Primary | ICD-10-CM

## 2019-01-21 DIAGNOSIS — K59.00 CONSTIPATION, UNSPECIFIED CONSTIPATION TYPE: ICD-10-CM

## 2019-01-21 DIAGNOSIS — R53.83 FATIGUE, UNSPECIFIED TYPE: ICD-10-CM

## 2019-01-21 DIAGNOSIS — R63.5 WEIGHT GAIN: ICD-10-CM

## 2019-01-21 DIAGNOSIS — Z23 NEED FOR INFLUENZA VACCINATION: ICD-10-CM

## 2019-01-21 PROCEDURE — 90688 IIV4 VACCINE SPLT 0.5 ML IM: CPT | Performed by: HOSPITALIST

## 2019-01-21 PROCEDURE — 99213 OFFICE O/P EST LOW 20 MIN: CPT | Performed by: FAMILY MEDICINE

## 2019-01-21 PROCEDURE — 99211 OFF/OP EST MAY X REQ PHY/QHP: CPT | Performed by: FAMILY MEDICINE

## 2019-01-21 RX ORDER — CETIRIZINE HYDROCHLORIDE 10 MG/1
10 TABLET ORAL DAILY
Qty: 30 TABLET | Refills: 3 | Status: SHIPPED | OUTPATIENT
Start: 2019-01-21 | End: 2020-06-08

## 2019-01-21 ASSESSMENT — ENCOUNTER SYMPTOMS
ABDOMINAL PAIN: 0
RHINORRHEA: 1
COUGH: 0
SHORTNESS OF BREATH: 0
VOMITING: 0
NAUSEA: 0
CONSTIPATION: 1

## 2019-01-22 ENCOUNTER — HOSPITAL ENCOUNTER (OUTPATIENT)
Age: 39
Discharge: HOME | End: 2019-01-22
Payer: COMMERCIAL

## 2019-01-22 DIAGNOSIS — K59.00 CONSTIPATION, UNSPECIFIED CONSTIPATION TYPE: ICD-10-CM

## 2019-01-22 DIAGNOSIS — R53.83 FATIGUE, UNSPECIFIED TYPE: ICD-10-CM

## 2019-01-22 DIAGNOSIS — R63.5 WEIGHT GAIN: ICD-10-CM

## 2019-01-22 LAB — TSH SERPL DL<=0.05 MIU/L-ACNC: 1.63 MIU/L (ref 0.3–5)

## 2019-01-22 PROCEDURE — 36415 COLL VENOUS BLD VENIPUNCTURE: CPT

## 2019-01-22 PROCEDURE — 84443 ASSAY THYROID STIM HORMONE: CPT

## 2019-02-27 DIAGNOSIS — J45.909 UNCOMPLICATED ASTHMA, UNSPECIFIED ASTHMA SEVERITY, UNSPECIFIED WHETHER PERSISTENT: ICD-10-CM

## 2019-03-20 ENCOUNTER — HOSPITAL ENCOUNTER (OUTPATIENT)
Age: 39
Discharge: HOME | End: 2019-03-20
Payer: COMMERCIAL

## 2019-03-20 LAB
ABSOLUTE EOS #: 0.41 K/UL (ref 0–0.44)
ABSOLUTE IMMATURE GRANULOCYTE: <0.03 K/UL (ref 0–0.3)
ABSOLUTE LYMPH #: 1.78 K/UL (ref 1.1–3.7)
ABSOLUTE MONO #: 0.6 K/UL (ref 0.1–1.2)
ALBUMIN SERPL-MCNC: 4.1 G/DL (ref 3.5–5.2)
ALP BLD-CCNC: 64 U/L (ref 35–104)
ALT SERPL-CCNC: 12 U/L (ref 5–33)
AST SERPL-CCNC: 21 U/L
BASOPHILS # BLD: 1 % (ref 0–2)
BASOPHILS ABSOLUTE: 0.06 K/UL (ref 0–0.2)
C-REACTIVE PROTEIN: 6.3 MG/L (ref 0–5)
CALCIUM SERPL-MCNC: 9 MG/DL (ref 8.6–10.4)
CREAT SERPL-MCNC: 0.67 MG/DL (ref 0.5–0.9)
DIFFERENTIAL TYPE: ABNORMAL
EOSINOPHILS RELATIVE PERCENT: 6 % (ref 1–4)
GFR AFRICAN AMERICAN: >60 ML/MIN
GFR NON-AFRICAN AMERICAN: >60 ML/MIN
GFR SERPL CREATININE-BSD FRML MDRD: NORMAL ML/MIN/{1.73_M2}
GFR SERPL CREATININE-BSD FRML MDRD: NORMAL ML/MIN/{1.73_M2}
HCT VFR BLD CALC: 44.4 % (ref 36.3–47.1)
HEMOGLOBIN: 14.2 G/DL (ref 11.9–15.1)
IMMATURE GRANULOCYTES: 0 %
LYMPHOCYTES # BLD: 28 % (ref 24–43)
MCH RBC QN AUTO: 28.7 PG (ref 25.2–33.5)
MCHC RBC AUTO-ENTMCNC: 32 G/DL (ref 28.4–34.8)
MCV RBC AUTO: 89.9 FL (ref 82.6–102.9)
MONOCYTES # BLD: 9 % (ref 3–12)
NRBC AUTOMATED: 0 PER 100 WBC
PDW BLD-RTO: 12.3 % (ref 11.8–14.4)
PLATELET # BLD: 320 K/UL (ref 138–453)
PLATELET ESTIMATE: ABNORMAL
PMV BLD AUTO: 11.3 FL (ref 8.1–13.5)
RBC # BLD: 4.94 M/UL (ref 3.95–5.11)
RBC # BLD: ABNORMAL 10*6/UL
SEDIMENTATION RATE, ERYTHROCYTE: 14 MM (ref 0–20)
SEG NEUTROPHILS: 56 % (ref 36–65)
SEGMENTED NEUTROPHILS ABSOLUTE COUNT: 3.52 K/UL (ref 1.5–8.1)
WBC # BLD: 6.4 K/UL (ref 3.5–11.3)
WBC # BLD: ABNORMAL 10*3/UL

## 2019-03-20 PROCEDURE — 84460 ALANINE AMINO (ALT) (SGPT): CPT

## 2019-03-20 PROCEDURE — 86140 C-REACTIVE PROTEIN: CPT

## 2019-03-20 PROCEDURE — 82565 ASSAY OF CREATININE: CPT

## 2019-03-20 PROCEDURE — 85025 COMPLETE CBC W/AUTO DIFF WBC: CPT

## 2019-03-20 PROCEDURE — 82310 ASSAY OF CALCIUM: CPT

## 2019-03-20 PROCEDURE — 85651 RBC SED RATE NONAUTOMATED: CPT

## 2019-03-20 PROCEDURE — 36415 COLL VENOUS BLD VENIPUNCTURE: CPT

## 2019-03-20 PROCEDURE — 82040 ASSAY OF SERUM ALBUMIN: CPT

## 2019-03-20 PROCEDURE — 84075 ASSAY ALKALINE PHOSPHATASE: CPT

## 2019-03-20 PROCEDURE — 84450 TRANSFERASE (AST) (SGOT): CPT

## 2019-06-17 ENCOUNTER — NURSE ONLY (OUTPATIENT)
Dept: FAMILY MEDICINE CLINIC | Age: 39
End: 2019-06-17
Payer: COMMERCIAL

## 2019-06-17 DIAGNOSIS — Z11.1 SCREENING-PULMONARY TB: Primary | ICD-10-CM

## 2019-06-17 PROCEDURE — 86580 TB INTRADERMAL TEST: CPT | Performed by: FAMILY MEDICINE

## 2019-06-17 NOTE — PROGRESS NOTES
Patient here today for nurse visit for PPD injection for schoolPatient was injected in left forearm. Has appointment on 6-19-19 for reading.               If test is not read within 48-72 hours of initial placement, patient advised to repeat in other arm in 1-3 weeks after this test

## 2019-06-19 ENCOUNTER — NURSE ONLY (OUTPATIENT)
Dept: FAMILY MEDICINE CLINIC | Age: 39
End: 2019-06-19
Payer: COMMERCIAL

## 2019-06-19 DIAGNOSIS — Z11.1 SCREENING-PULMONARY TB: Primary | ICD-10-CM

## 2019-06-19 LAB
INDURATION: 0
TB SKIN TEST: NEGATIVE

## 2019-06-19 NOTE — PROGRESS NOTES
Patient here for PPD  reading. Patient was injected on 6-18-19 in left  forearm with the following results.      PPD Reading Note    Results  0.0  mm induration  Interpretation negative  Allergic reaction  none

## 2019-06-19 NOTE — PATIENT INSTRUCTIONS
was injected on 6-17-19  -in left  forearm with the following results.      PPD Reading Note    Results  0.0  mm induration  Interpretation negative  Allergic reaction  none

## 2019-07-22 ENCOUNTER — HOSPITAL ENCOUNTER (OUTPATIENT)
Age: 39
Discharge: HOME | End: 2019-07-22
Payer: COMMERCIAL

## 2019-07-22 ENCOUNTER — OFFICE VISIT (OUTPATIENT)
Dept: FAMILY MEDICINE CLINIC | Age: 39
End: 2019-07-22
Payer: COMMERCIAL

## 2019-07-22 VITALS
HEIGHT: 63 IN | WEIGHT: 152.8 LBS | DIASTOLIC BLOOD PRESSURE: 88 MMHG | SYSTOLIC BLOOD PRESSURE: 138 MMHG | HEART RATE: 89 BPM | BODY MASS INDEX: 27.07 KG/M2

## 2019-07-22 DIAGNOSIS — J30.9 ALLERGIC RHINITIS, UNSPECIFIED SEASONALITY, UNSPECIFIED TRIGGER: Primary | ICD-10-CM

## 2019-07-22 DIAGNOSIS — H69.83 DYSFUNCTION OF BOTH EUSTACHIAN TUBES: ICD-10-CM

## 2019-07-22 DIAGNOSIS — L40.50 PSORIATIC ARTHRITIS (HCC): ICD-10-CM

## 2019-07-22 DIAGNOSIS — J45.909 UNCOMPLICATED ASTHMA, UNSPECIFIED ASTHMA SEVERITY, UNSPECIFIED WHETHER PERSISTENT: ICD-10-CM

## 2019-07-22 DIAGNOSIS — Z98.890: ICD-10-CM

## 2019-07-22 LAB
ABSOLUTE EOS #: 0.23 K/UL (ref 0–0.44)
ABSOLUTE IMMATURE GRANULOCYTE: <0.03 K/UL (ref 0–0.3)
ABSOLUTE LYMPH #: 2.01 K/UL (ref 1.1–3.7)
ABSOLUTE MONO #: 0.68 K/UL (ref 0.1–1.2)
ALBUMIN SERPL-MCNC: 4.3 G/DL (ref 3.5–5.2)
ALP BLD-CCNC: 64 U/L (ref 35–104)
ALT SERPL-CCNC: 14 U/L (ref 5–33)
AST SERPL-CCNC: 21 U/L
BASOPHILS # BLD: 1 % (ref 0–2)
BASOPHILS ABSOLUTE: 0.05 K/UL (ref 0–0.2)
C-REACTIVE PROTEIN: 10.5 MG/L (ref 0–5)
CALCIUM SERPL-MCNC: 9.1 MG/DL (ref 8.6–10.4)
CREAT SERPL-MCNC: 0.77 MG/DL (ref 0.5–0.9)
DIFFERENTIAL TYPE: NORMAL
EOSINOPHILS RELATIVE PERCENT: 3 % (ref 1–4)
GFR AFRICAN AMERICAN: >60 ML/MIN
GFR NON-AFRICAN AMERICAN: >60 ML/MIN
GFR SERPL CREATININE-BSD FRML MDRD: NORMAL ML/MIN/{1.73_M2}
GFR SERPL CREATININE-BSD FRML MDRD: NORMAL ML/MIN/{1.73_M2}
HCT VFR BLD CALC: 44.7 % (ref 36.3–47.1)
HEMOGLOBIN: 14 G/DL (ref 11.9–15.1)
IMMATURE GRANULOCYTES: 0 %
LYMPHOCYTES # BLD: 24 % (ref 24–43)
MCH RBC QN AUTO: 27.9 PG (ref 25.2–33.5)
MCHC RBC AUTO-ENTMCNC: 31.3 G/DL (ref 28.4–34.8)
MCV RBC AUTO: 89.2 FL (ref 82.6–102.9)
MONOCYTES # BLD: 8 % (ref 3–12)
NRBC AUTOMATED: 0 PER 100 WBC
PDW BLD-RTO: 12.7 % (ref 11.8–14.4)
PLATELET # BLD: 317 K/UL (ref 138–453)
PLATELET ESTIMATE: NORMAL
PMV BLD AUTO: 11.3 FL (ref 8.1–13.5)
RBC # BLD: 5.01 M/UL (ref 3.95–5.11)
RBC # BLD: NORMAL 10*6/UL
SEG NEUTROPHILS: 64 % (ref 36–65)
SEGMENTED NEUTROPHILS ABSOLUTE COUNT: 5.24 K/UL (ref 1.5–8.1)
WBC # BLD: 8.2 K/UL (ref 3.5–11.3)
WBC # BLD: NORMAL 10*3/UL

## 2019-07-22 PROCEDURE — 36415 COLL VENOUS BLD VENIPUNCTURE: CPT

## 2019-07-22 PROCEDURE — 84075 ASSAY ALKALINE PHOSPHATASE: CPT

## 2019-07-22 PROCEDURE — 85651 RBC SED RATE NONAUTOMATED: CPT

## 2019-07-22 PROCEDURE — 84450 TRANSFERASE (AST) (SGOT): CPT

## 2019-07-22 PROCEDURE — 84460 ALANINE AMINO (ALT) (SGPT): CPT

## 2019-07-22 PROCEDURE — 86140 C-REACTIVE PROTEIN: CPT

## 2019-07-22 PROCEDURE — 85025 COMPLETE CBC W/AUTO DIFF WBC: CPT

## 2019-07-22 PROCEDURE — 99213 OFFICE O/P EST LOW 20 MIN: CPT | Performed by: STUDENT IN AN ORGANIZED HEALTH CARE EDUCATION/TRAINING PROGRAM

## 2019-07-22 PROCEDURE — 82040 ASSAY OF SERUM ALBUMIN: CPT

## 2019-07-22 PROCEDURE — 82565 ASSAY OF CREATININE: CPT

## 2019-07-22 PROCEDURE — 82310 ASSAY OF CALCIUM: CPT

## 2019-07-22 RX ORDER — MONTELUKAST SODIUM 10 MG/1
10 TABLET ORAL NIGHTLY
Qty: 30 TABLET | Refills: 3 | Status: SHIPPED | OUTPATIENT
Start: 2019-07-22

## 2019-07-22 RX ORDER — ALBUTEROL SULFATE 90 UG/1
AEROSOL, METERED RESPIRATORY (INHALATION)
Qty: 18 G | Refills: 0 | Status: SHIPPED | OUTPATIENT
Start: 2019-07-22

## 2019-07-22 ASSESSMENT — PATIENT HEALTH QUESTIONNAIRE - PHQ9
SUM OF ALL RESPONSES TO PHQ9 QUESTIONS 1 & 2: 0
1. LITTLE INTEREST OR PLEASURE IN DOING THINGS: 0
SUM OF ALL RESPONSES TO PHQ QUESTIONS 1-9: 0
SUM OF ALL RESPONSES TO PHQ QUESTIONS 1-9: 0
2. FEELING DOWN, DEPRESSED OR HOPELESS: 0

## 2019-07-22 ASSESSMENT — ENCOUNTER SYMPTOMS
COUGH: 0
RHINORRHEA: 1
WHEEZING: 0
NAUSEA: 0
SHORTNESS OF BREATH: 0
CHEST TIGHTNESS: 0
VOMITING: 0
ABDOMINAL PAIN: 0
DIARRHEA: 0

## 2019-07-22 NOTE — PROGRESS NOTES
Visit Information    Have you changed or started any medications since your last visit including any over-the-counter medicines, vitamins, or herbal medicines? no   Have you stopped taking any of your medications? Is so, why? -  no  Are you having any side effects from any of your medications? - no    Have you seen any other physician or provider since your last visit?  no   Have you had any other diagnostic tests since your last visit? yes - labs    Have you been seen in the emergency room and/or had an admission in a hospital since we last saw you?  no   Have you had your routine dental cleaning in the past 6 months?  no     Do you have an active MyChart account? If no, what is the barrier?   Yes    Patient Care Team:  Nydia Almeida MD as PCP - General (Family Medicine)  Az Oden DO as PCP - [facility] Provider  Noé Fry MD as Consulting Physician (Obstetrics & Gynecology)  Chaya Duran MD as Obstetrician (Perinatology)    Medical History Review  Past Medical, Family, and Social History reviewed and does contribute to the patient presenting condition    Health Maintenance   Topic Date Due    Varicella Vaccine (1 of 2 - 13+ 2-dose series) 11/02/1993    Flu vaccine (1) 09/01/2019    Cervical cancer screen  02/23/2023    DTaP/Tdap/Td vaccine (2 - Td) 02/09/2028    Pneumococcal 0-64 years Vaccine  Completed    HIV screen  Completed
Musculoskeletal: Normal range of motion. She exhibits no edema or tenderness. Neurological: She is alert and oriented to person, place, and time. No cranial nerve deficit. Skin: Skin is warm and dry. Capillary refill takes less than 2 seconds. She is not diaphoretic. Vitals reviewed. Vitals:    07/22/19 1413 07/22/19 1454   BP: (!) 137/96 138/88   Site: Right Upper Arm    Position: Sitting    Cuff Size: Medium Adult    Pulse: 89    Weight: 152 lb 12.8 oz (69.3 kg)    Height: 5' 3\" (1.6 m)        Assessment:       Diagnosis Orders   1. Allergic rhinitis, unspecified seasonality, unspecified trigger  montelukast (SINGULAIR) 10 MG tablet    Tori Healy MD, Pediatric Allergy & Immunology, [place]   2. Uncomplicated asthma, unspecified asthma severity, unspecified whether persistent  albuterol sulfate HFA (VENTOLIN HFA) 108 (90 Base) MCG/ACT inhaler   3. Status post correction of deviated nasal septum     4. Psoriatic arthritis ([address]     5.  Dysfunction of both eustachian tubes           Plan:      - Referral to allergy specialist for allergy testing and further management  - Educated pt on saline flushes to alleviate symptoms and upkeep moisture in airway, denies active epistaxis since stopping Flonase   - Continue Singulair and albuterol PRN (refills given)  - Pt remains on Humira q14 days, denies flares  - F/u in 6 months to reevaluate allergies during winter months and after establishing care w/ specialist        Lisa Robledo MD

## 2019-07-23 LAB — SEDIMENTATION RATE, ERYTHROCYTE: 17 MM (ref 0–20)

## 2019-10-01 ENCOUNTER — TELEPHONE (OUTPATIENT)
Dept: FAMILY MEDICINE CLINIC | Age: 39
End: 2019-10-01

## 2019-10-04 ENCOUNTER — OFFICE VISIT (OUTPATIENT)
Dept: FAMILY MEDICINE CLINIC | Age: 39
End: 2019-10-04
Payer: COMMERCIAL

## 2019-10-04 DIAGNOSIS — Z23 NEED FOR IMMUNIZATION AGAINST INFLUENZA: Primary | ICD-10-CM

## 2019-10-04 PROCEDURE — 90686 IIV4 VACC NO PRSV 0.5 ML IM: CPT | Performed by: FAMILY MEDICINE

## 2019-12-05 ENCOUNTER — TELEPHONE (OUTPATIENT)
Dept: INTERNAL MEDICINE | Age: 39
End: 2019-12-05

## 2019-12-11 ENCOUNTER — TELEPHONE (OUTPATIENT)
Dept: PHARMACY | Age: 39
End: 2019-12-11

## 2019-12-11 RX ORDER — BUDESONIDE AND FORMOTEROL FUMARATE DIHYDRATE 160; 4.5 UG/1; UG/1
AEROSOL RESPIRATORY (INHALATION)
COMMUNITY

## 2019-12-11 RX ORDER — AZELASTINE 1 MG/ML
1 SPRAY, METERED NASAL 2 TIMES DAILY
COMMUNITY

## 2019-12-12 ENCOUNTER — OFFICE VISIT (OUTPATIENT)
Dept: INTERNAL MEDICINE | Age: 39
End: 2019-12-12
Payer: COMMERCIAL

## 2019-12-12 VITALS
DIASTOLIC BLOOD PRESSURE: 77 MMHG | HEART RATE: 86 BPM | BODY MASS INDEX: 25.87 KG/M2 | WEIGHT: 146 LBS | SYSTOLIC BLOOD PRESSURE: 122 MMHG | HEIGHT: 63 IN

## 2019-12-12 DIAGNOSIS — L40.50 PSORIATIC ARTHRITIS (HCC): ICD-10-CM

## 2019-12-12 PROCEDURE — PHARESTB PHARMACY SPECIALTY ESTABLISHED: Performed by: INTERNAL MEDICINE

## 2019-12-12 PROCEDURE — 99211 OFF/OP EST MAY X REQ PHY/QHP: CPT | Performed by: INTERNAL MEDICINE

## 2019-12-12 ASSESSMENT — ENCOUNTER SYMPTOMS
BACK PAIN: 0
EYE DISCHARGE: 0
ABDOMINAL PAIN: 0
SHORTNESS OF BREATH: 0
VOMITING: 0
WHEEZING: 0
COLOR CHANGE: 0
BLOOD IN STOOL: 0
NAUSEA: 0

## 2020-03-27 ENCOUNTER — VIRTUAL VISIT (OUTPATIENT)
Dept: FAMILY MEDICINE CLINIC | Age: 40
End: 2020-03-27
Payer: COMMERCIAL

## 2020-03-27 PROCEDURE — 99442 PR PHYS/QHP TELEPHONE EVALUATION 11-20 MIN: CPT | Performed by: STUDENT IN AN ORGANIZED HEALTH CARE EDUCATION/TRAINING PROGRAM

## 2020-03-27 NOTE — PROGRESS NOTES
Shin Porter is a 44 y.o. female evaluated via telephone on 3/27/2020. Consent:  She and/or health care decision maker is aware that that she may receive a bill for this telephone service, depending on her insurance coverage, and has provided verbal consent to proceed: Yes      Documentation:  I communicated with the patient and/or health care decision maker about LLQ pain   Details of this discussion including any medical advice provided:     Sharp LLQ abdominal pain   Started last menses, 2/27   Pain was intermittent, resolved once menses finished   Occurred a couple times during the month   Episode lasts 5-10 minutes  Worsens with movement  Relieved by tylenol     Started menses yesterday   Pain reoccurred   Last night started nbnb vomiting   Nauseous since   Has no significant gyn history   Has never had painful menses     Denies lightheadedness, HA, blurry vision, SOB, tachycardia, fever/chills, urinary/BM symptoms. Denies pregnancy: patient does not engage in sexual activity,  is unable to       Assessment/Plan  LLQ pain likely due to ovarian cyst   Patient educated on signs/symptoms of cyst   Advised patient to continue taking tylenol for pain control   Pt states she is not pregnant   Advised patient to go to ED if symptoms worsen   Will attain Ultrasound Pelvis      I affirm this is a Patient Initiated Episode with an Established Patient who has not had a related appointment within my department in the past 7 days or scheduled within the next 24 hours. Total Time: minutes: 11-20 minutes    Note: not billable if this call serves to triage the patient into an appointment for the relevant concern      Samuel Carpenter MD  Family Medicine Resident, PGY-2   11:30am   3/27/2020    Attending Physician Statement  I have discussed the care of Shin Porter, including pertinent history and exam findings,  with the resident.  I have reviewed the key elements of all parts of the encounter with the resident. I agree with the assessment, plan and orders as documented by the resident.   (GE Modifier)    Patient and provider located in [place], [place]  Time spent 15 min    Eleazar Wiley MD  3/27/20

## 2020-03-31 ENCOUNTER — TELEPHONE (OUTPATIENT)
Dept: OBGYN CLINIC | Age: 40
End: 2020-03-31

## 2020-03-31 NOTE — TELEPHONE ENCOUNTER
After checking with Dr. Lance Rosas, pt was notified to try Ibuprofen and rotating with Tylenol. Pt to let us know if this does not help with her pain.

## 2020-04-16 ENCOUNTER — TELEPHONE (OUTPATIENT)
Dept: FAMILY MEDICINE CLINIC | Age: 40
End: 2020-04-16

## 2020-05-06 ENCOUNTER — HOSPITAL ENCOUNTER (OUTPATIENT)
Age: 40
Discharge: HOME | End: 2020-05-06
Payer: COMMERCIAL

## 2020-05-06 LAB
ALBUMIN SERPL-MCNC: 4.3 G/DL (ref 3.5–5.2)
ALP BLD-CCNC: 50 U/L (ref 35–104)
ALT SERPL-CCNC: 14 U/L (ref 5–33)
AST SERPL-CCNC: 18 U/L
C-REACTIVE PROTEIN: 2.8 MG/L (ref 0–5)
CALCIUM SERPL-MCNC: 9.4 MG/DL (ref 8.6–10.4)
CREAT SERPL-MCNC: 0.78 MG/DL (ref 0.5–0.9)
GFR AFRICAN AMERICAN: >60 ML/MIN
GFR NON-AFRICAN AMERICAN: >60 ML/MIN
GFR SERPL CREATININE-BSD FRML MDRD: NORMAL ML/MIN/{1.73_M2}
GFR SERPL CREATININE-BSD FRML MDRD: NORMAL ML/MIN/{1.73_M2}
HCT VFR BLD CALC: 45.6 % (ref 36.3–47.1)
HEMOGLOBIN: 15.1 G/DL (ref 11.9–15.1)
MCH RBC QN AUTO: 29.2 PG (ref 25.2–33.5)
MCHC RBC AUTO-ENTMCNC: 33.1 G/DL (ref 28.4–34.8)
MCV RBC AUTO: 88 FL (ref 82.6–102.9)
NRBC AUTOMATED: 0 PER 100 WBC
PDW BLD-RTO: 12.2 % (ref 11.8–14.4)
PLATELET # BLD: 339 K/UL (ref 138–453)
PMV BLD AUTO: 11.2 FL (ref 8.1–13.5)
RBC # BLD: 5.18 M/UL (ref 3.95–5.11)
SEDIMENTATION RATE, ERYTHROCYTE: 16 MM (ref 0–20)
WBC # BLD: 6.2 K/UL (ref 3.5–11.3)

## 2020-05-06 PROCEDURE — 84460 ALANINE AMINO (ALT) (SGPT): CPT

## 2020-05-06 PROCEDURE — 82040 ASSAY OF SERUM ALBUMIN: CPT

## 2020-05-06 PROCEDURE — 86140 C-REACTIVE PROTEIN: CPT

## 2020-05-06 PROCEDURE — 85651 RBC SED RATE NONAUTOMATED: CPT

## 2020-05-06 PROCEDURE — 84450 TRANSFERASE (AST) (SGOT): CPT

## 2020-05-06 PROCEDURE — 82565 ASSAY OF CREATININE: CPT

## 2020-05-06 PROCEDURE — 82310 ASSAY OF CALCIUM: CPT

## 2020-05-06 PROCEDURE — 84075 ASSAY ALKALINE PHOSPHATASE: CPT

## 2020-05-06 PROCEDURE — 36415 COLL VENOUS BLD VENIPUNCTURE: CPT

## 2020-05-06 PROCEDURE — 85027 COMPLETE CBC AUTOMATED: CPT

## 2020-05-27 ENCOUNTER — TELEPHONE (OUTPATIENT)
Dept: INTERNAL MEDICINE | Age: 40
End: 2020-05-27

## 2020-05-27 NOTE — TELEPHONE ENCOUNTER
Patient is scheduled for a Medication Management appointment on 6/10/20 at  11:00 am. The patient will be seen Telephone visit- patient's home.

## 2020-06-08 ENCOUNTER — NURSE ONLY (OUTPATIENT)
Dept: FAMILY MEDICINE CLINIC | Age: 40
End: 2020-06-08
Payer: COMMERCIAL

## 2020-06-08 ENCOUNTER — TELEPHONE (OUTPATIENT)
Dept: INTERNAL MEDICINE | Age: 40
End: 2020-06-08

## 2020-06-08 PROCEDURE — 86580 TB INTRADERMAL TEST: CPT | Performed by: FAMILY MEDICINE

## 2020-06-08 RX ORDER — TIZANIDINE 4 MG/1
4 TABLET ORAL 3 TIMES DAILY PRN
Refills: 1 | COMMUNITY
Start: 2020-05-06

## 2020-06-08 NOTE — TELEPHONE ENCOUNTER
nasal spray.  montelukast (SINGULAIR) 10 MG tablet Take 1 tablet by mouth nightly 30 tablet 3    albuterol sulfate HFA (VENTOLIN HFA) 108 (90 Base) MCG/ACT inhaler INHALE 1 PUFFS INTO THE LUNGS 2 TIMES DAILY 18 g 0    celecoxib (CELEBREX) 200 MG capsule Take 200 mg by mouth 2 times daily      Prenatal Multivit-Min-Fe-FA (PRENATAL VITAMINS PO) Take 1 tablet by mouth daily       No current facility-administered medications for this visit.      _____________________________________________________________________  Drug Interactions:  No clinically significant interactions identified via [employer] Interaction Analysis as category D or higher.  _____________________________________________________________________  Renal Dosing:  Creatinine Clearance: Estimated Creatinine Clearance: 89 mL/min (based on SCr of 0.78 mg/dL). No renal adjustments necessary. _____________________________________________________________________  Labs:  CBC:   Lab Results   Component Value Date    WBC 6.2 05/06/2020    HGB 15.1 05/06/2020     05/06/2020       BMP:   Lab Results   Component Value Date    CREATININE 0.78 05/06/2020       FASTING LIPID PANEL:   Lab Results   Component Value Date    HDL 75 02/09/2018       LFTS:   Lab Results   Component Value Date    AST 18 05/06/2020    ALT 14 05/06/2020    ALKPHOS 50 05/06/2020        ______________________________________________________________________  Assessment/Plan:  Patient continues on Humira for treatment of PsA/PsO. Last seen by specialist 5/11/2020. No clinically significant drug/drug interactions identified. Monitoring labs recently completed, stable/WNL. PNA vaccine already given. Patient last TB screen from 2 years ago, consider repeating screen annually. No other recommendation with current therapy identified.      Adalimumab (Humira)   Warnings to monitor for: Anaphylaxis/hypersensitivity reactions, Positive antinuclear antibody titers (even with negative baseline), Demyelinating CNS disease (new-onset or exacerbation), Heart failure (Worsening or new-onset), pancytopenia and aplastic anemia, reactivation of hepatitis B (HBV), Infections: [US Boxed Warning], Malignancy: [US Boxed Warning], Tuberculosis: [US Boxed Warning]  Recommended monitoring: Monitor improvement of symptoms and physical function assessments, signs/symptoms/worsening of heart failure; TB screening (every 6-12 months dependent upon length of therapy and other risk factors), CBC with differential (every 6 weeks for 3 months then annually); LFTs (Annually); HBV screening (annual); signs/symptoms of malignancy (eg, splenomegaly, hepatomegaly, abdominal pain, persistent fever, night sweats, weight loss).    _____________________________________________________________________  Next Follow-Up:    SMS - 6/10/2020   Rheum - ~9/2020      Aly Gramajo PharmD   Ambulatory Clinical Pharmacist   12:24 PM

## 2020-06-08 NOTE — PROGRESS NOTES
Pt here for nurse visit for PPD for school injected into right arm will have it read in 48 to 72 hours tolerated well.

## 2020-06-10 ENCOUNTER — OFFICE VISIT (OUTPATIENT)
Dept: INTERNAL MEDICINE | Age: 40
End: 2020-06-10
Payer: COMMERCIAL

## 2020-06-10 ENCOUNTER — NURSE ONLY (OUTPATIENT)
Dept: FAMILY MEDICINE CLINIC | Age: 40
End: 2020-06-10
Payer: COMMERCIAL

## 2020-06-10 PROCEDURE — PHARESTB PHARMACY SPECIALTY ESTABLISHED: Performed by: INTERNAL MEDICINE

## 2020-06-10 ASSESSMENT — ENCOUNTER SYMPTOMS
COLOR CHANGE: 0
VOMITING: 0
BLOOD IN STOOL: 0
NAUSEA: 0
WHEEZING: 0
BACK PAIN: 0
SHORTNESS OF BREATH: 0
ABDOMINAL PAIN: 0
EYE DISCHARGE: 0

## 2020-06-10 NOTE — PATIENT INSTRUCTIONS
Patient here today for nurse visit for PPD reading. Patient was injected on  6-8-2020 in right forearm with the following results.      PPD Reading Note    Results  0.0 mm induration  Interpretation  negative  Allergic reaction none

## 2020-06-10 NOTE — PATIENT INSTRUCTIONS
Medications e-scribe to pharmacy of pt's choice. Patient will be contacted to schedule their next appointment.     GABE

## 2020-06-10 NOTE — PROGRESS NOTES
intolerance and heat intolerance. Genitourinary: Negative for dysuria and frequency. Musculoskeletal: Negative for arthralgias, back pain and joint swelling. Skin: Negative for color change and rash. Neurological: Negative for dizziness, tremors, seizures and light-headedness. Psychiatric/Behavioral: Negative for agitation and confusion. PHYSICAL EXAM:  There were no vitals filed for this visit. BP Readings from Last 3 Encounters:   12/12/19 122/77   07/22/19 138/88   01/21/19 121/80          ASSESSMENT AND PLAN:  Diagnoses and all orders for this visit:    Psoriatic arthritis ([facility] [address]  -     adalimumab (HUMIRA PEN) 40 MG/0.8ML injection; Inject 40 mg into the skin every 14 days      FOLLOW UP AND INSTRUCTIONS:  · Follow up with in 12 months. · Natalya Scale received counseling on the following healthy behaviors: nutrition and exercise    · Discussed use, benefit, and side effects of prescribed medications. Barriers to medication compliance addressed. All patient questions answered. Pt voiced understanding. Melania Garcia M.D., [address] Special[facility] Medication Service [facility]  6/10/2020, 10:33 AM

## 2020-06-17 ENCOUNTER — OFFICE VISIT (OUTPATIENT)
Dept: OBGYN CLINIC | Age: 40
End: 2020-06-17
Payer: COMMERCIAL

## 2020-06-17 VITALS
WEIGHT: 150 LBS | HEART RATE: 80 BPM | SYSTOLIC BLOOD PRESSURE: 134 MMHG | BODY MASS INDEX: 26.58 KG/M2 | DIASTOLIC BLOOD PRESSURE: 80 MMHG | HEIGHT: 63 IN

## 2020-06-17 PROCEDURE — 99213 OFFICE O/P EST LOW 20 MIN: CPT | Performed by: OBSTETRICS & GYNECOLOGY

## 2020-06-17 ASSESSMENT — ENCOUNTER SYMPTOMS
COUGH: 0
BACK PAIN: 0
SHORTNESS OF BREATH: 0
ABDOMINAL PAIN: 0

## 2020-06-17 NOTE — PROGRESS NOTES
[facility]  [facility] - [address] [facility] [address] [facility]  Dept: [phone]  Dept Fax: [phone]    20    Chief Complaint   Patient presents with    Pelvic Pain     comes and goes, onset sometime in February. Pt has not had the pelvic US her PCP ordered done. Farideh Tolbert 44 y.o. has a complaint of pain with her periods and sometimes with ovulation. She says that the pain started in February. She says the pain lasts a couple days when it happens. She took ibuprofen and tylenol but that didn't help much. She was ordered an ultrasound by her PCP, but hasn't done it because of COVID-19. She says the pain is mostly on her left side and is more of a stabbing pain. Review of Systems   Constitutional: Negative for chills and fever. HENT: Negative for congestion. Respiratory: Negative for cough and shortness of breath. Cardiovascular: Negative for chest pain and palpitations. Gastrointestinal: Negative for abdominal pain. Endocrine: Negative for cold intolerance and heat intolerance. Genitourinary: Positive for menstrual problem. Negative for vaginal discharge. Musculoskeletal: Negative for back pain. Neurological: Negative for dizziness and light-headedness. Psychiatric/Behavioral: The patient is not nervous/anxious. Gynecologic History  Patient's last menstrual period was 2020 (approximate).   Contraception: abstinence  Last Pap: 18  Results: normal  Last Mammogram: n/a    Obstetric History  : 0  Para: 0  AB: 0    Past Medical History:   Diagnosis Date    Asthma     Osteoarthritis     Psoriasis      Past Surgical History:   Procedure Laterality Date    NASAL SEPTUM SURGERY Bilateral      Allergies   Allergen Reactions    Pcn [Penicillins] Rash    Doxycycline      Current Outpatient Medications   Medication Sig Dispense Refill    adalimumab (HUMIRA PEN) 40 MG/0.8ML injection Inject 40 mg

## 2020-06-23 ENCOUNTER — HOSPITAL ENCOUNTER (OUTPATIENT)
Dept: ULTRASOUND IMAGING | Age: 40
Discharge: HOME | End: 2020-06-25
Payer: COMMERCIAL

## 2020-06-23 PROCEDURE — 76856 US EXAM PELVIC COMPLETE: CPT

## 2020-06-24 ENCOUNTER — TELEPHONE (OUTPATIENT)
Dept: FAMILY MEDICINE CLINIC | Age: 40
End: 2020-06-24

## 2020-06-24 ENCOUNTER — TELEPHONE (OUTPATIENT)
Dept: OBGYN CLINIC | Age: 40
End: 2020-06-24

## 2020-06-24 NOTE — TELEPHONE ENCOUNTER
Patient called office asking for her [address] results from 06/23/2020. Patient was told physician needs to review results and will call patient with results. Per patient request, patient is asking result be sent to her OB/GYN as well. Message sent to both physicians.

## 2020-06-26 ENCOUNTER — TELEPHONE (OUTPATIENT)
Dept: FAMILY MEDICINE CLINIC | Age: 40
End: 2020-06-26

## 2020-06-26 RX ORDER — ALPRAZOLAM 0.25 MG/1
0.25 TABLET ORAL ONCE
Qty: 1 TABLET | Refills: 0 | Status: SHIPPED | OUTPATIENT
Start: 2020-06-26 | End: 2020-06-26

## 2020-07-01 RX ORDER — ADALIMUMAB 40MG/0.4ML
40 KIT SUBCUTANEOUS
Qty: 2 EACH | Refills: 12 | Status: SHIPPED | OUTPATIENT
Start: 2020-07-01 | End: 2021-05-24 | Stop reason: SDUPTHER

## 2020-07-01 NOTE — TELEPHONE ENCOUNTER
Patient called as part of [facility] accreditation for speciality medication. Doing well regarding Humira therapy but does report stinging/burning with injection. Humira now has a citrate free version that minimizes stinging/burning with injections. Patient wishes to try this version of medication to see if this helps. It is 1:1 conversion as only buffers differ.

## 2020-07-02 ENCOUNTER — TELEPHONE (OUTPATIENT)
Dept: FAMILY MEDICINE CLINIC | Age: 40
End: 2020-07-02

## 2020-08-24 ENCOUNTER — HOSPITAL ENCOUNTER (EMERGENCY)
Age: 40
Discharge: HOME | End: 2020-08-24
Attending: EMERGENCY MEDICINE
Payer: COMMERCIAL

## 2020-08-24 ENCOUNTER — APPOINTMENT (OUTPATIENT)
Dept: ULTRASOUND IMAGING | Age: 40
End: 2020-08-24
Payer: COMMERCIAL

## 2020-08-24 VITALS
DIASTOLIC BLOOD PRESSURE: 87 MMHG | WEIGHT: 150 LBS | BODY MASS INDEX: 22.73 KG/M2 | HEART RATE: 85 BPM | OXYGEN SATURATION: 98 % | HEIGHT: 68 IN | RESPIRATION RATE: 12 BRPM | TEMPERATURE: 98.4 F | SYSTOLIC BLOOD PRESSURE: 132 MMHG

## 2020-08-24 LAB
-: ABNORMAL
ABSOLUTE EOS #: 0.26 K/UL (ref 0–0.44)
ABSOLUTE IMMATURE GRANULOCYTE: <0.03 K/UL (ref 0–0.3)
ABSOLUTE LYMPH #: 1.98 K/UL (ref 1.1–3.7)
ABSOLUTE MONO #: 0.59 K/UL (ref 0.1–1.2)
ALBUMIN SERPL-MCNC: 4.1 G/DL (ref 3.5–5.2)
ALBUMIN/GLOBULIN RATIO: 1.2 (ref 1–2.5)
ALP BLD-CCNC: 48 U/L (ref 35–104)
ALT SERPL-CCNC: 10 U/L (ref 5–33)
AMORPHOUS: ABNORMAL
ANION GAP SERPL CALCULATED.3IONS-SCNC: 10 MMOL/L (ref 9–17)
AST SERPL-CCNC: 17 U/L
BACTERIA: ABNORMAL
BASOPHILS # BLD: 1 % (ref 0–2)
BASOPHILS ABSOLUTE: 0.06 K/UL (ref 0–0.2)
BILIRUB SERPL-MCNC: 0.26 MG/DL (ref 0.3–1.2)
BILIRUBIN URINE: NEGATIVE
BUN BLDV-MCNC: 12 MG/DL (ref 6–20)
BUN/CREAT BLD: ABNORMAL (ref 9–20)
CALCIUM SERPL-MCNC: 9.1 MG/DL (ref 8.6–10.4)
CASTS UA: ABNORMAL /LPF (ref 0–8)
CHLORIDE BLD-SCNC: 102 MMOL/L (ref 98–107)
CO2: 24 MMOL/L (ref 20–31)
COLOR: YELLOW
CREAT SERPL-MCNC: 0.75 MG/DL (ref 0.5–0.9)
CRYSTALS, UA: ABNORMAL /HPF
DIFFERENTIAL TYPE: NORMAL
EOSINOPHILS RELATIVE PERCENT: 4 % (ref 1–4)
EPITHELIAL CELLS UA: ABNORMAL /HPF (ref 0–5)
GFR AFRICAN AMERICAN: >60 ML/MIN
GFR NON-AFRICAN AMERICAN: >60 ML/MIN
GFR SERPL CREATININE-BSD FRML MDRD: ABNORMAL ML/MIN/{1.73_M2}
GFR SERPL CREATININE-BSD FRML MDRD: ABNORMAL ML/MIN/{1.73_M2}
GLUCOSE BLD-MCNC: 93 MG/DL (ref 70–99)
GLUCOSE URINE: NEGATIVE
HCG QUALITATIVE: NEGATIVE
HCT VFR BLD CALC: 45.1 % (ref 36.3–47.1)
HEMOGLOBIN: 14.8 G/DL (ref 11.9–15.1)
IMMATURE GRANULOCYTES: 0 %
KETONES, URINE: NEGATIVE
LACTIC ACID, WHOLE BLOOD: 0.9 MMOL/L (ref 0.7–2.1)
LACTIC ACID: NORMAL MMOL/L
LEUKOCYTE ESTERASE, URINE: NEGATIVE
LIPASE: 37 U/L (ref 13–60)
LYMPHOCYTES # BLD: 29 % (ref 24–43)
MCH RBC QN AUTO: 29.7 PG (ref 25.2–33.5)
MCHC RBC AUTO-ENTMCNC: 32.8 G/DL (ref 28.4–34.8)
MCV RBC AUTO: 90.6 FL (ref 82.6–102.9)
MONOCYTES # BLD: 9 % (ref 3–12)
MUCUS: ABNORMAL
NITRITE, URINE: NEGATIVE
NRBC AUTOMATED: 0 PER 100 WBC
OTHER OBSERVATIONS UA: ABNORMAL
PDW BLD-RTO: 12.5 % (ref 11.8–14.4)
PH UA: 5 (ref 5–8)
PLATELET # BLD: 347 K/UL (ref 138–453)
PLATELET ESTIMATE: NORMAL
PMV BLD AUTO: 10.4 FL (ref 8.1–13.5)
POTASSIUM SERPL-SCNC: 4.3 MMOL/L (ref 3.7–5.3)
PROTEIN UA: NEGATIVE
RBC # BLD: 4.98 M/UL (ref 3.95–5.11)
RBC # BLD: NORMAL 10*6/UL
RBC UA: ABNORMAL /HPF (ref 0–4)
RENAL EPITHELIAL, UA: ABNORMAL /HPF
SEG NEUTROPHILS: 57 % (ref 36–65)
SEGMENTED NEUTROPHILS ABSOLUTE COUNT: 3.87 K/UL (ref 1.5–8.1)
SODIUM BLD-SCNC: 136 MMOL/L (ref 135–144)
SPECIFIC GRAVITY UA: 1.03 (ref 1–1.03)
TOTAL PROTEIN: 7.5 G/DL (ref 6.4–8.3)
TRICHOMONAS: ABNORMAL
TURBIDITY: CLEAR
URINE HGB: ABNORMAL
UROBILINOGEN, URINE: NORMAL
WBC # BLD: 6.8 K/UL (ref 3.5–11.3)
WBC # BLD: NORMAL 10*3/UL
WBC UA: ABNORMAL /HPF (ref 0–5)
YEAST: ABNORMAL

## 2020-08-24 PROCEDURE — 80053 COMPREHEN METABOLIC PANEL: CPT

## 2020-08-24 PROCEDURE — 93976 VASCULAR STUDY: CPT

## 2020-08-24 PROCEDURE — 99284 EMERGENCY DEPT VISIT MOD MDM: CPT

## 2020-08-24 PROCEDURE — 85025 COMPLETE CBC W/AUTO DIFF WBC: CPT

## 2020-08-24 PROCEDURE — 83690 ASSAY OF LIPASE: CPT

## 2020-08-24 PROCEDURE — 96372 THER/PROPH/DIAG INJ SC/IM: CPT

## 2020-08-24 PROCEDURE — 83605 ASSAY OF LACTIC ACID: CPT

## 2020-08-24 PROCEDURE — 81001 URINALYSIS AUTO W/SCOPE: CPT

## 2020-08-24 PROCEDURE — 84703 CHORIONIC GONADOTROPIN ASSAY: CPT

## 2020-08-24 PROCEDURE — 76830 TRANSVAGINAL US NON-OB: CPT

## 2020-08-24 PROCEDURE — 6360000002 HC RX W HCPCS: Performed by: STUDENT IN AN ORGANIZED HEALTH CARE EDUCATION/TRAINING PROGRAM

## 2020-08-24 RX ORDER — ONDANSETRON 4 MG/1
4 TABLET, FILM COATED ORAL EVERY 8 HOURS PRN
Qty: 20 TABLET | Refills: 0 | Status: SHIPPED | OUTPATIENT
Start: 2020-08-24 | End: 2021-05-24

## 2020-08-24 RX ORDER — MORPHINE SULFATE 4 MG/ML
4 INJECTION, SOLUTION INTRAMUSCULAR; INTRAVENOUS ONCE
Status: COMPLETED | OUTPATIENT
Start: 2020-08-24 | End: 2020-08-24

## 2020-08-24 RX ORDER — ONDANSETRON 2 MG/ML
4 INJECTION INTRAMUSCULAR; INTRAVENOUS ONCE
Status: COMPLETED | OUTPATIENT
Start: 2020-08-24 | End: 2020-08-24

## 2020-08-24 RX ADMIN — ONDANSETRON 4 MG: 2 INJECTION INTRAMUSCULAR; INTRAVENOUS at 20:50

## 2020-08-24 RX ADMIN — MORPHINE SULFATE 4 MG: 4 INJECTION INTRAVENOUS at 20:50

## 2020-08-24 ASSESSMENT — PAIN SCALES - GENERAL
PAINLEVEL_OUTOF10: 3
PAINLEVEL_OUTOF10: 8
PAINLEVEL_OUTOF10: 8

## 2020-08-24 ASSESSMENT — PAIN DESCRIPTION - DESCRIPTORS: DESCRIPTORS: SHARP

## 2020-08-24 ASSESSMENT — PAIN DESCRIPTION - PAIN TYPE: TYPE: CHRONIC PAIN

## 2020-08-24 ASSESSMENT — PAIN DESCRIPTION - LOCATION: LOCATION: ABDOMEN

## 2020-08-24 ASSESSMENT — PAIN DESCRIPTION - ONSET: ONSET: ON-GOING

## 2020-08-24 ASSESSMENT — PAIN DESCRIPTION - PROGRESSION: CLINICAL_PROGRESSION: GRADUALLY WORSENING

## 2020-08-24 ASSESSMENT — PAIN - FUNCTIONAL ASSESSMENT: PAIN_FUNCTIONAL_ASSESSMENT: PREVENTS OR INTERFERES SOME ACTIVE ACTIVITIES AND ADLS

## 2020-08-25 ENCOUNTER — TELEPHONE (OUTPATIENT)
Dept: OBGYN CLINIC | Age: 40
End: 2020-08-25

## 2020-08-25 ASSESSMENT — ENCOUNTER SYMPTOMS
ABDOMINAL DISTENTION: 0
COUGH: 0
VOMITING: 1
SORE THROAT: 0
DIARRHEA: 0
EYE PAIN: 0
CHEST TIGHTNESS: 0
RHINORRHEA: 0
BACK PAIN: 0
ABDOMINAL PAIN: 1
NAUSEA: 1
SHORTNESS OF BREATH: 0
CONSTIPATION: 0

## 2020-08-25 NOTE — ED PROVIDER NOTES
[address] [facility]     Emergency Department     Faculty Attestation        I performed a history and physical examination of the patient and discussed management with the resident. I reviewed the residents note and agree with the documented findings and plan of care. Any areas of disagreement are noted on the chart. I was personally present for the key portions of any procedures. I have documented in the chart those procedures where I was not present during the key portions. I have reviewed the emergency nurses triage note. I agree with the chief complaint, past medical history, past surgical history, allergies, medications, social and family history as documented unless otherwise noted below. For mid-level providers such as nurse practitioners as well as physicians assistants:    I have personally seen and evaluated the patient. I find the patient's history and physical exam are consistent with NP/PA documentation. I agree with the care provided, treatment rendered, disposition, & follow-up plan. Additional findings are as noted. Vital Signs: /87   Pulse 85   Temp 98.4 °F (36.9 °C) (Oral)   Resp 12   Ht 5' 8\" (1.727 m)   Wt 150 lb (68 kg)   SpO2 98%   BMI 22.81 kg/m²   PCP:  Enmanuel Gerard MD    Pertinent Comments:     Patient complains of left-sided pelvic pain she has a known history of a left ovarian cyst that is large at 7 cm. The pain is been worsening over the past day or so.   She appears in no acute distress is slightly uncomfortable but otherwise afebrile nontoxic obtain labs, transvaginal ultrasound to rule out torsion, pain control, reassessment      Critical Care  None          Jesus Brower MD  Attending Emergency Medicine Physician              Aldo Reyes MD  08/24/20 2041

## 2020-08-25 NOTE — TELEPHONE ENCOUNTER
Pt wanted to call in and let you know she went to the ER for pain in her left side that radiated around to her back and down her leg. She thought she had an ovarian cyst on her left but its on her right. They did a US and she said it has not gotten any smaller. She hasn't gotten the [address] done that you gave her just the bedside Us at the hospital. She wants to know if you want her to come in sooner than September or no.

## 2020-08-25 NOTE — ED PROVIDER NOTES
[address] [facility]  ED  Emergency Department Encounter  Emergency Medicine Resident     Pt Name: Fartun Hernandez  MRN: [id number]  Nixongfkathleen 1980  Date of evaluation: 8/24/20  PCP:  Na Song MD    [address]       Chief Complaint   Patient presents with    Abdominal Pain    Cough       HISTORY OFPRESENT ILLNESS  (Location/Symptom, Timing/Onset, Context/Setting, Quality, Duration, Modifying Factors,Severity.)      Fartun Hernandez is a 44 y. o.yo female who presents with sharp shooting pain 8-10/10 in her left lower quadrant for the past several days. She does have a history of a left ovarian cyst that is 7.5 cm in diameter, which is caused her chronic dull aching pain since February, however the pain acutely worsened to sharp stabbing over the past several days during her menstrual period. Reports this pain radiates from her groin to her upper thigh and is worse with certain movements and positions. She denies any diarrhea, constipation, fevers, chills. The pain does make her nauseated and she has thrown up twice, nonbloody nonbilious emesis. She denies any abnormal vaginal discharge or vaginal bleeding. No recent travel, no sick contacts. PAST MEDICAL / SURGICAL / SOCIAL / FAMILY HISTORY      has a past medical history of Asthma, Osteoarthritis, and Psoriasis. has a past surgical history that includes Nasal septum surgery (Bilateral, 1996). Social:  reports that she has never smoked. She has never used smokeless tobacco. She reports that she does not drink alcohol or use drugs.     Family Hx:   Family History   Problem Relation Age of Onset    Heart Attack Mother     Diabetes Father     Diabetes Brother     Heart Disease Maternal Grandmother     Diabetes Maternal Grandmother     No Known Problems Maternal Grandfather     Heart Attack Paternal Grandmother     Heart Attack Paternal Grandfather         Allergies:  Pcn [penicillins] and Doxycycline    Home Medications:  Prior to Admission medications    Medication Sig Start Date End Date Taking? Authorizing Provider   ondansetron (ZOFRAN) 4 MG tablet Take 1 tablet by mouth every 8 hours as needed for Nausea 8/24/20  Yes Cinthya Avalos MD   Adalimumab (HUMIRA PEN) 40 MG/0.4ML PNKT Inject 40 mg into the skin every 14 days 7/1/20   [facility] MD Jos   tiZANidine (ZANAFLEX) 4 MG tablet Take 4 mg by mouth 3 times daily as needed 5/6/20   Libra Saab MD   budesonide-formoterol (SYMBICORT) 160-4.5 MCG/ACT AERO Inhaler 2 puffs by mouth (7 minutes apart) two times daily (about every 12 hours)    Caitlin Goss MD   azelastine (ASTELIN) 0.1 % nasal spray 1 spray by Nasal route 2 times daily Use in each nostril as directed 15 minutes before fluticasone nasal spray. Caitlin Goss MD   montelukast (SINGULAIR) 10 MG tablet Take 1 tablet by mouth nightly 7/22/19   Deshaun Montez MD   albuterol sulfate HFA (VENTOLIN HFA) 108 (90 Base) MCG/ACT inhaler INHALE 1 PUFFS INTO THE LUNGS 2 TIMES DAILY 7/22/19   Deshaun Montez MD   celecoxib (CELEBREX) 200 MG capsule Take 200 mg by mouth 2 times daily    Historical Provider, MD       REVIEW OFSYSTEMS    (2-9 systems for level 4, 10 or more for level 5)      Review of Systems   Constitutional: Negative for appetite change, chills, diaphoresis, fatigue and fever. HENT: Negative for congestion, rhinorrhea, sneezing and sore throat. Eyes: Negative for pain and visual disturbance. Respiratory: Negative for cough, chest tightness and shortness of breath. Cardiovascular: Negative for chest pain, palpitations and leg swelling. Gastrointestinal: Positive for abdominal pain, nausea and vomiting. Negative for abdominal distention, constipation and diarrhea. Endocrine: Negative for polyuria. Genitourinary: Negative for decreased urine volume, difficulty urinating, dysuria, flank pain, hematuria, pelvic pain, urgency, vaginal bleeding, vaginal discharge and vaginal pain. Endometrial stripe: Endometrial stripe is within normal limits. Right Ovary: There is a large right ovarian cyst measuring 7.0 cm x 7.6 cm x 4.4 cm with thin rim of surrounding ovarian tissue. Due to bowel gas and the thin rim of ovarian tissue, Doppler evaluation was difficult. Arterial and venous Doppler flow to the right ovary is grossly intact. Left Ovary:  Left ovary is within normal limits. There is normal arterial and venous doppler flow. Free Fluid: No evidence of free fluid. The large right ovarian cyst measuring up to 7.6 cm has not changed significantly in size from 06/23/2020. Due to the thin rim of surrounding right ovarian tissue and bowel gas, Doppler evaluation was technically difficult. Arterial and venous Doppler flow to the right ovary is grossly intact. Us Dup Abd Pel Retro Scrot Limited    Result Date: 8/24/2020  EXAMINATION: PELVIC ULTRASOUND 8/24/2020 TECHNIQUE: Transvaginal and transabdominal pelvic ultrasound was performed. Color Doppler evaluation was performed. COMPARISON: 06/23/2020 HISTORY: ORDERING SYSTEM PROVIDED HISTORY: h/o left cyst, r/o torsion TECHNOLOGIST PROVIDED HISTORY: h/o left cyst, r/o torsion FINDINGS: Measurements: Uterus:  5.2 cm x 3.5 cm x 3.0 cm Endometrial stripe:  4 mm Right Ovary:  7.4 cm x 5.0 cm x 7.8 cm Left Ovary:  1.8 cm x 1.2 cm x 1.2 cm Ultrasound Findings: Uterus: Uterus demonstrates normal myometrial echotexture. Endometrial stripe: Endometrial stripe is within normal limits. Right Ovary: There is a large right ovarian cyst measuring 7.0 cm x 7.6 cm x 4.4 cm with thin rim of surrounding ovarian tissue. Due to bowel gas and the thin rim of ovarian tissue, Doppler evaluation was difficult. Arterial and venous Doppler flow to the right ovary is grossly intact. Left Ovary:  Left ovary is within normal limits. There is normal arterial and venous doppler flow. Free Fluid: No evidence of free fluid.      The large right ovarian cyst measuring up to 7.6 cm has not changed significantly in size from 06/23/2020. Due to the thin rim of surrounding right ovarian tissue and bowel gas, Doppler evaluation was technically difficult. Arterial and venous Doppler flow to the right ovary is grossly intact. EMERGENCY DEPARTMENT COURSE:  ED Course as of Aug 25 0148   Mon Aug 24, 2020   2201 Labs within normal limits    [JT]   2245 Ultrasound showed stable right sided ovarian cyst.  No signs of rupture. Arterial and venous flow visualized to both ovaries, no evidence of torsion. [JT]   e Aug 25, 2020   0146 Given unremarkable lab values, normal transvaginal ultrasound, will plan to discharge patient with strict return precautions. I do not feel that this patient's symptoms are due to a GI etiology. She was reassured that her ovarian cyst did not rupture and was happy with this news. Patient is medically stable for discharge and was provided with strict return precautions to include worsening abdominal pain, nausea, vomiting, fever, chills, inability to tolerate p.o. She does have a follow-up appointment with her OB/GYN and is going to call them in the morning to try to schedule a closer follow-up appointment. [JT]      ED Course User Index  [JT] Alex Middleton MD         PROCEDURES:  None    CONSULTS:  None      FINAL IMPRESSION      1. Left lower quadrant abdominal pain    2.  Non-intractable vomiting with nausea, unspecified vomiting type          DISPOSITION / PLAN     DISPOSITION        PATIENT REFERRED TO:  Vladimir Butler 188  [address] [facility] [address]  [phone]  In 1 day  As needed    [facility]  [address] [place] [address]  [phone]  Go to   If symptoms worsen      DISCHARGE MEDICATIONS:  Discharge Medication List as of 8/24/2020 10:48 PM      START taking these medications    Details   ondansetron (ZOFRAN) 4 MG tablet Take 1 tablet by mouth every 8 hours as needed for Nausea, Disp-20

## 2020-08-25 NOTE — ED NOTES
Updated patient on plan of care. Verbalized understanding. Denies needs. Call light in reach. Family at bedside.       Chadwick Del Rio 139, RN  08/24/20 111 [address], RN  08/24/20 5727

## 2020-08-25 NOTE — TELEPHONE ENCOUNTER
Since it is stable in size (7.6 cm) and hasn't grown any she doesn't need to be sooner. Since it has not gone away we can talk in the office about removing it if that is what she wants to do.   Thanks

## 2020-08-25 NOTE — TELEPHONE ENCOUNTER
Left detailed message with recommendations and let pt know she can call with questions or to schedule apt.

## 2020-10-05 ENCOUNTER — OFFICE VISIT (OUTPATIENT)
Dept: OBGYN CLINIC | Age: 40
End: 2020-10-05
Payer: COMMERCIAL

## 2020-10-05 VITALS
HEIGHT: 63 IN | SYSTOLIC BLOOD PRESSURE: 123 MMHG | DIASTOLIC BLOOD PRESSURE: 84 MMHG | BODY MASS INDEX: 26.84 KG/M2 | WEIGHT: 151.5 LBS | HEART RATE: 91 BPM

## 2020-10-05 PROCEDURE — 99213 OFFICE O/P EST LOW 20 MIN: CPT | Performed by: OBSTETRICS & GYNECOLOGY

## 2020-10-05 ASSESSMENT — ENCOUNTER SYMPTOMS
SHORTNESS OF BREATH: 0
BACK PAIN: 0
ABDOMINAL PAIN: 0
COUGH: 0

## 2020-10-05 NOTE — PROGRESS NOTES
[facility] PHYSICIANS  [facility] [address] [facility]  Dept: [phone]  Dept Fax: [phone]    10/05/20    Chief Complaint   Patient presents with    Follow-up     ovarian cyst        Peggy Hammond 44 y.o. is here for follow up from her ovarian cyst.  She had an ultrasound originally in  due to pelvic pain that started a few months prior to the ultrasound. On the ultrasound there was a 7 cm right ovarian cyst.  She repeated her ultrasound 6 weeks ago and it is still the same. She is here to discuss what she would like to do next. She is having pain every other month, and it is on the left. She doesn't want to be on birth control because it might cause weight gain. Review of Systems   Constitutional: Negative for chills and fever. HENT: Negative for congestion. Respiratory: Negative for cough and shortness of breath. Cardiovascular: Negative for chest pain and palpitations. Gastrointestinal: Negative for abdominal pain. Genitourinary: Positive for pelvic pain. Negative for dyspareunia and menstrual problem. Musculoskeletal: Negative for back pain. Neurological: Negative for dizziness and light-headedness. Psychiatric/Behavioral: The patient is not nervous/anxious. Gynecologic History  Patient's last menstrual period was 2020.   Contraception: abstinence  Last Pap: 20  Results: normal  Last Mammogram: n/a    Obstetric History  : 0  Para: 0  AB: 0    Past Medical History:   Diagnosis Date    Asthma     Osteoarthritis     Psoriasis      Past Surgical History:   Procedure Laterality Date    NASAL SEPTUM SURGERY Bilateral      Allergies   Allergen Reactions    Pcn [Penicillins] Rash    Doxycycline      Current Outpatient Medications   Medication Sig Dispense Refill    ondansetron (ZOFRAN) 4 MG tablet Take 1 tablet by mouth every 8 hours as needed for Nausea 20 tablet 0    Adalimumab (HUMIRA PEN) 40 MG/0.4ML PNKT Inject 40 mg into the skin every 14 days 2 each 12    tiZANidine (ZANAFLEX) 4 MG tablet Take 4 mg by mouth 3 times daily as needed  1    budesonide-formoterol (SYMBICORT) 160-4.5 MCG/ACT AERO Inhaler 2 puffs by mouth (7 minutes apart) two times daily (about every 12 hours)      azelastine (ASTELIN) 0.1 % nasal spray 1 spray by Nasal route 2 times daily Use in each nostril as directed 15 minutes before fluticasone nasal spray.  montelukast (SINGULAIR) 10 MG tablet Take 1 tablet by mouth nightly 30 tablet 3    albuterol sulfate HFA (VENTOLIN HFA) 108 (90 Base) MCG/ACT inhaler INHALE 1 PUFFS INTO THE LUNGS 2 TIMES DAILY 18 g 0    celecoxib (CELEBREX) 200 MG capsule Take 200 mg by mouth 2 times daily       No current facility-administered medications for this visit.       Social History     Socioeconomic History    Marital status:      Spouse name: Not on file    Number of children: Not on file    Years of education: Not on file    Highest education level: Not on file   Occupational History    Not on file   Social Needs    Financial resource strain: Not on file    Food insecurity     Worry: Not on file     Inability: Not on file    Transportation needs     Medical: Not on file     Non-medical: Not on file   Tobacco Use    Smoking status: Never Smoker    Smokeless tobacco: Never Used   Substance and Sexual Activity    Alcohol use: No    Drug use: No    Sexual activity: Not Currently     Partners: Male   Lifestyle    Physical activity     Days per week: Not on file     Minutes per session: Not on file    Stress: Not on file   Relationships    Social connections     Talks on phone: Not on file     Gets together: Not on file     Attends [employer] service: Not on file     Active member of club or organization: Not on file     Attends meetings of clubs or organizations: Not on file     Relationship status: Not on file    Intimate partner violence     Fear of current or ex partner: Not on file     Emotionally abused: Not on file     Physically abused: Not on file     Forced sexual activity: Not on file   Other Topics Concern    Not on file   Social History Narrative    Not on file     Family History   Problem Relation Age of Onset    Heart Attack Mother     Diabetes Father     Diabetes Brother     Heart Disease Maternal Grandmother     Diabetes Maternal Grandmother     No Known Problems Maternal Grandfather     Heart Attack Paternal Grandmother     Heart Attack Paternal Grandfather        Physical exam Physical Exam  Constitutional:       Appearance: Normal appearance. She is normal weight. HENT:      Head: Normocephalic. Eyes:      Extraocular Movements: Extraocular movements intact. Pulmonary:      Effort: Pulmonary effort is normal.   Neurological:      Mental Status: She is alert and oriented to person, place, and time. Psychiatric:         Mood and Affect: Mood normal.         Behavior: Behavior normal.         Thought Content: Thought content normal.         Judgment: Judgment normal.           TVUS:   FINDINGS:         Measurements:         Uterus:  5.2 cm x 3.5 cm x 3.0 cm         Endometrial stripe:  4 mm         Right Ovary:  7.4 cm x 5.0 cm x 7.8 cm         Left Ovary:  1.8 cm x 1.2 cm x 1.2 cm              Ultrasound Findings:         Uterus: Uterus demonstrates normal myometrial echotexture.         Endometrial stripe: Endometrial stripe is within normal limits.         Right Ovary: There is a large right ovarian cyst measuring 7.0 cm x 7.6 cm x    4.4 cm with thin rim of surrounding ovarian tissue.  Due to bowel gas and the    thin rim of ovarian tissue, Doppler evaluation was difficult.  Arterial and    venous Doppler flow to the right ovary is grossly intact.         Left Ovary:  Left ovary is within normal limits.  There is normal arterial and    venous doppler flow.         Free Fluid: No evidence of free fluid.              Impression    The large right ovarian cyst measuring up to 7.6 cm has not changed    significantly in size from 06/23/2020.         Due to the thin rim of surrounding right ovarian tissue and bowel gas,    Doppler evaluation was technically difficult.  Arterial and venous Doppler    flow to the right ovary is grossly intact. Assessment/Plan  1. Right ovarian cyst  2. Pelvic pain  - Discussed continued observation vs stopping ovulation with birth control vs surgery. Pt is unable to have any surgery until December because that is when her break from school is. She doesn't want to do any more hormones. She will let me know in December how she is feeling and if she still might want surgery.       Pt to follow up SANKET Hawley MD  [address]

## 2021-01-21 ENCOUNTER — HOSPITAL ENCOUNTER (OUTPATIENT)
Age: 41
Discharge: HOME | End: 2021-01-21
Payer: COMMERCIAL

## 2021-01-21 LAB
ALBUMIN SERPL-MCNC: 4.2 G/DL (ref 3.5–5.2)
ALP BLD-CCNC: 54 U/L (ref 35–104)
ALT SERPL-CCNC: 12 U/L (ref 5–33)
AST SERPL-CCNC: 19 U/L
C-REACTIVE PROTEIN: 5.7 MG/L (ref 0–5)
CALCIUM SERPL-MCNC: 9.9 MG/DL (ref 8.6–10.4)
CREAT SERPL-MCNC: 0.82 MG/DL (ref 0.5–0.9)
GFR AFRICAN AMERICAN: >60 ML/MIN
GFR NON-AFRICAN AMERICAN: >60 ML/MIN
GFR SERPL CREATININE-BSD FRML MDRD: NORMAL ML/MIN/{1.73_M2}
GFR SERPL CREATININE-BSD FRML MDRD: NORMAL ML/MIN/{1.73_M2}
HCT VFR BLD CALC: 42.9 % (ref 36.3–47.1)
HEMOGLOBIN: 13.9 G/DL (ref 11.9–15.1)
MCH RBC QN AUTO: 28.2 PG (ref 25.2–33.5)
MCHC RBC AUTO-ENTMCNC: 32.4 G/DL (ref 28.4–34.8)
MCV RBC AUTO: 87 FL (ref 82.6–102.9)
NRBC AUTOMATED: 0 PER 100 WBC
PDW BLD-RTO: 12.6 % (ref 11.8–14.4)
PLATELET # BLD: 350 K/UL (ref 138–453)
PMV BLD AUTO: 10.7 FL (ref 8.1–13.5)
RBC # BLD: 4.93 M/UL (ref 3.95–5.11)
SEDIMENTATION RATE, ERYTHROCYTE: 22 MM (ref 0–20)
WBC # BLD: 7.9 K/UL (ref 3.5–11.3)

## 2021-01-21 PROCEDURE — 84450 TRANSFERASE (AST) (SGOT): CPT

## 2021-01-21 PROCEDURE — 82040 ASSAY OF SERUM ALBUMIN: CPT

## 2021-01-21 PROCEDURE — 82310 ASSAY OF CALCIUM: CPT

## 2021-01-21 PROCEDURE — 36415 COLL VENOUS BLD VENIPUNCTURE: CPT

## 2021-01-21 PROCEDURE — 82565 ASSAY OF CREATININE: CPT

## 2021-01-21 PROCEDURE — 84460 ALANINE AMINO (ALT) (SGPT): CPT

## 2021-01-21 PROCEDURE — 85027 COMPLETE CBC AUTOMATED: CPT

## 2021-01-21 PROCEDURE — 85652 RBC SED RATE AUTOMATED: CPT

## 2021-01-21 PROCEDURE — 84075 ASSAY ALKALINE PHOSPHATASE: CPT

## 2021-01-21 PROCEDURE — 86140 C-REACTIVE PROTEIN: CPT

## 2021-01-25 ENCOUNTER — TELEPHONE (OUTPATIENT)
Dept: OBGYN CLINIC | Age: 41
End: 2021-01-25

## 2021-01-25 DIAGNOSIS — N83.201 RIGHT OVARIAN CYST: Primary | ICD-10-CM

## 2021-01-25 NOTE — TELEPHONE ENCOUNTER
Pt had a ovarian cyst and was seen back in October she has not had any pain but was wondering if she could get a US to see if the cyst is still there

## 2021-02-02 ENCOUNTER — HOSPITAL ENCOUNTER (OUTPATIENT)
Dept: ULTRASOUND IMAGING | Age: 41
Discharge: HOME | End: 2021-02-04
Payer: COMMERCIAL

## 2021-02-02 DIAGNOSIS — N83.201 RIGHT OVARIAN CYST: ICD-10-CM

## 2021-02-02 PROCEDURE — 76856 US EXAM PELVIC COMPLETE: CPT

## 2021-02-02 PROCEDURE — 76830 TRANSVAGINAL US NON-OB: CPT

## 2021-05-10 ENCOUNTER — TELEPHONE (OUTPATIENT)
Dept: INTERNAL MEDICINE | Age: 41
End: 2021-05-10

## 2021-05-12 NOTE — TELEPHONE ENCOUNTER
Patient is scheduled for a Medication Management appointment on 5/24/21 at  9:00 am. The patient will be seen via Telephone visit.

## 2021-05-17 ENCOUNTER — NURSE ONLY (OUTPATIENT)
Dept: FAMILY MEDICINE CLINIC | Age: 41
End: 2021-05-17
Payer: COMMERCIAL

## 2021-05-17 DIAGNOSIS — Z23 NEED FOR TUBERCULOSIS VACCINATION: Primary | ICD-10-CM

## 2021-05-17 PROCEDURE — 99211 OFF/OP EST MAY X REQ PHY/QHP: CPT

## 2021-05-17 PROCEDURE — 86580 TB INTRADERMAL TEST: CPT

## 2021-05-17 ASSESSMENT — PATIENT HEALTH QUESTIONNAIRE - PHQ9
SUM OF ALL RESPONSES TO PHQ QUESTIONS 1-9: 0
SUM OF ALL RESPONSES TO PHQ QUESTIONS 1-9: 0

## 2021-05-17 NOTE — PROGRESS NOTES
Patient here today for nurse visit for PPD vaccine. Patient was injected on 05/17/21 in right forearm. Step # 1. Patient scheduled for her TB read 05/20/21. 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

## 2021-05-20 ENCOUNTER — NURSE ONLY (OUTPATIENT)
Dept: FAMILY MEDICINE CLINIC | Age: 41
End: 2021-05-20
Payer: COMMERCIAL

## 2021-05-20 DIAGNOSIS — Z11.1 ENCOUNTER FOR PPD TEST: Primary | ICD-10-CM

## 2021-05-20 LAB
INDURATION: NORMAL
TB SKIN TEST: NORMAL

## 2021-05-20 NOTE — PATIENT INSTRUCTIONS
Patient here today for nurse visit for PPD reading. Patient was injected on 05/17/21 in right forearm with the following results.      PPD Reading Note    Results 00 mm induration  Interpretation Negative  Allergic reaction N/A    If test is not read within 48-72 hours of initial placement, patient advised to repeat in other arm in 1-3 weeks after this test

## 2021-05-24 ENCOUNTER — OFFICE VISIT (OUTPATIENT)
Dept: INTERNAL MEDICINE | Age: 41
End: 2021-05-24
Payer: COMMERCIAL

## 2021-05-24 DIAGNOSIS — L40.50 PSORIATIC ARTHRITIS (HCC): ICD-10-CM

## 2021-05-24 PROCEDURE — 99999 PR OFFICE/OUTPT VISIT,PROCEDURE ONLY: CPT | Performed by: PHARMACIST

## 2021-05-24 RX ORDER — ADALIMUMAB 40MG/0.4ML
40 KIT SUBCUTANEOUS
Qty: 2 EACH | Refills: 12 | Status: SHIPPED | OUTPATIENT
Start: 2021-05-24 | End: 2022-04-08 | Stop reason: SDUPTHER

## 2021-05-24 ASSESSMENT — PROMIS GLOBAL HEALTH SCALE
IN THE PAST 7 DAYS, HOW WOULD YOU RATE YOUR PAIN ON AVERAGE [ON A SCALE FROM 0 (NO PAIN) TO 10 (WORST IMAGINABLE PAIN)]?: 5
TO WHAT EXTENT ARE YOU ABLE TO CARRY OUT YOUR EVERYDAY PHYSICAL ACTIVITIES SUCH AS WALKING, CLIMBING STAIRS, CARRYING GROCERIES, OR MOVING A CHAIR [ON A SCALE OF 1 (NOT AT ALL) TO 5 (COMPLETELY)]?: 5
SUM OF RESPONSES TO QUESTIONS 3, 6, 7, & 8: 17
IN GENERAL, HOW WOULD YOU RATE YOUR SATISFACTION WITH YOUR SOCIAL ACTIVITIES AND RELATIONSHIPS [ON A SCALE OF 1 (POOR) TO 5 (EXCELLENT)]?: 3
IN GENERAL, HOW WOULD YOU RATE YOUR MENTAL HEALTH, INCLUDING YOUR MOOD AND YOUR ABILITY TO THINK [ON A SCALE OF 1 (POOR) TO 5 (EXCELLENT)]?: 3
IN THE PAST 7 DAYS, HOW OFTEN HAVE YOU BEEN BOTHERED BY EMOTIONAL PROBLEMS, SUCH AS FEELING ANXIOUS, DEPRESSED, OR IRRITABLE [ON A SCALE FROM 1 (NEVER) TO 5 (ALWAYS)]?: 1
IN GENERAL, HOW WOULD YOU RATE YOUR PHYSICAL HEALTH [ON A SCALE OF 1 (POOR) TO 5 (EXCELLENT)]?: 3

## 2021-05-24 ASSESSMENT — PATIENT HEALTH QUESTIONNAIRE - PHQ9
SUM OF ALL RESPONSES TO PHQ QUESTIONS 1-9: 0
SUM OF ALL RESPONSES TO PHQ QUESTIONS 1-9: 0
2. FEELING DOWN, DEPRESSED OR HOPELESS: 0
SUM OF ALL RESPONSES TO PHQ QUESTIONS 1-9: 0
SUM OF ALL RESPONSES TO PHQ9 QUESTIONS 1 & 2: 0
1. LITTLE INTEREST OR PLEASURE IN DOING THINGS: 0

## 2021-05-24 NOTE — PROGRESS NOTES
Specialty Medication Service    Patient's Name: Tara Napoles YOB: 1980            Reason for visit: Tara Napoles is a 36 y.o. female contacted today for Specialty Medication Service visit follow up. Patient last seen by [facility] 6/10/2020. Patient continues on SMS formulary medication, Humira. Pharmacy completed Specialty Medication Service visit for medication monitoring and counseling. Medication list updated. Specialty Medication: Humira 40MG/0.4ML Pen   Frequency: Every 14 days  Indication: Psoriatic arthritis/Psoriasis  Initially Diagnosed: ~2016  Additional Therapy:   · N/A  Previous Therapy:   · Methotrexate + Folic acid (discontinued d/t oral ulcers)      Specialist:   Anh Briggs MD  [facility]  [address] [place] [address]  [phone]  Specialist Progress Note Available: Yes  Last Specialist Visit: 1/21/2021     Allergies   Allergen Reactions    Pcn [Penicillins] Rash    Doxycycline        Past Medical History:   Diagnosis Date    Asthma     Osteoarthritis     Psoriasis       Social History     Tobacco Use    Smoking status: Never Smoker    Smokeless tobacco: Never Used   Substance Use Topics    Alcohol use: No     Family History   Problem Relation Age of Onset    Heart Attack Mother     Diabetes Father     Diabetes Brother     Heart Disease Maternal Grandmother     Diabetes Maternal Grandmother     No Known Problems Maternal Grandfather     Heart Attack Paternal Grandmother     Heart Attack Paternal Grandfather        INTERM HISTORY  Have you been diagnosed with any additional conditions since we last talked? no  Have you developed any new allergies since we last talked? no  Have you stopped taking any medications or supplements since we last talked? no  Have you started taking any additional medications or supplements since we last talked?  no    REVIEW OF CURRENT DISEASE STATE  Psoriatic Arthritis: Patient with diagnosis of psoriatic arthritis. Symptoms have been present for several years. Current symptoms include None and are of no severity. Symptoms are made worse by: rainy weather. Symptoms are helped by arthritis medications and movement. Overall disease activity:  Improved and stable on humira. Limitation on activities include none. No skin symptoms reported today - clear on humira. · Are you currently having a flare? no  · Do you currently have any kind of infection? no  · Considering all the ways in which illness and health conditions may affect you at this time, please indicate below how you are doing: (0 = very well, 10 = very poorly)? 2  · How would you rate your pain on average? (0 = no pain, 10 = worst pain imaginable) 5  · During the past 4 weeks, have you missed any activities of daily living (work/school)? No  · During the past 4 weeks, have you had to seek urgent care? No    MEDICATIONS  Current Outpatient Medications   Medication Sig Dispense Refill    ondansetron (ZOFRAN) 4 MG tablet Take 1 tablet by mouth every 8 hours as needed for Nausea 20 tablet 0    Adalimumab (HUMIRA PEN) 40 MG/0.4ML PNKT Inject 40 mg into the skin every 14 days 2 each 12    tiZANidine (ZANAFLEX) 4 MG tablet Take 4 mg by mouth 3 times daily as needed  1    budesonide-formoterol (SYMBICORT) 160-4.5 MCG/ACT AERO Inhaler 2 puffs by mouth (7 minutes apart) two times daily (about every 12 hours)      azelastine (ASTELIN) 0.1 % nasal spray 1 spray by Nasal route 2 times daily Use in each nostril as directed 15 minutes before fluticasone nasal spray.  montelukast (SINGULAIR) 10 MG tablet Take 1 tablet by mouth nightly 30 tablet 3    albuterol sulfate HFA (VENTOLIN HFA) 108 (90 Base) MCG/ACT inhaler INHALE 1 PUFFS INTO THE LUNGS 2 TIMES DAILY 18 g 0    celecoxib (CELEBREX) 200 MG capsule Take 200 mg by mouth 2 times daily       No current facility-administered medications for this visit.        Current Specialty Medication: Adalimumab (Humira)   Warnings to monitor for: Anaphylaxis/hypersensitivity reactions, Positive antinuclear antibody titers (even with negative baseline), Demyelinating CNS disease (new-onset or exacerbation), Heart failure (Worsening or new-onset), pancytopenia and aplastic anemia, reactivation of hepatitis B (HBV), Infections: [US Boxed Warning], Malignancy: [US Boxed Warning], Tuberculosis: [US Boxed Warning]  Recommended monitoring: Monitor improvement of symptoms and physical function assessments, signs/symptoms/worsening of heart failure; TB screening (every 6-12 months dependent upon length of therapy and other risk factors), CBC with differential (every 6 weeks for 3 months then annually); LFTs (Annually); HBV screening (annual); signs/symptoms of malignancy (eg, splenomegaly, hepatomegaly, abdominal pain, persistent fever, night sweats, weight loss). Storage: Refrigerated at 36°F to 46°F (2°C to 8°C). DO NOT FREEZE. Do not use if frozen even if it has been thawed. Handling: May store at room temperature (If needed) for up to 14 days with protection from light. Discard if not used within the 14-day period. (keep track by writing down date removed from refrigerator). Do not use beyond the expiration date on the container. Specialty Medication Start Date: ~2016  Appropriate Dose: Yes  Last tuberculin Test: 5/17/2021   - Result: negative    Describe your medication adherence over the last 4 weeks: Very Good  How many doses have you missed in the last 4 weeks, if any? 0  Are you currently experiencing any side effects from this medication? None  How confident are you in the proper storage and handling of medication? (0-10) 10 Who injects? Self  How confident are you to follow the injection process and the treatment plan? (0-10) 10  Contraindications to therapy present? No    Drug Interactions:  No clinically significant interactions identified via Drivable Interaction Analysis as category D or higher. emotional problems such as feeling anxious, depressed or irritable? 1   In the past 7 days how would you rate your fatigue on average? 4   In the past 7 days how would you rate your pain on average? 5   My BACK PAIN at the moment is 2   Mode of Collection 1   Person Completing Survey 0   PROMIS Physical Score 17   PROMIS Mental Score 11       1. Psoriatic Arthritis   Thi Travis is a 36 y.o. female being treated for PsA.  Medication Reconciliation completed, no drug-drug interactions identified. Allergy and diagnosis info reviewed and updated. Thi Travis has a history remarkable for the following conditions: Asthma; PsA; Vitamin D deficiency    Current therapy includes: Humira 40mg every 14 days    Medication Effectiveness: Patient disease is  well controlled on current therapy.  Patient had no questions regarding the medication's warnings, precautions, and contraindications. Confirmed appropriate storage and disposal.   Patient had no concerns with the administration process.  Current disease state symptoms include: None - very well controlled on Humira   No side effects/adverse events reported, and no adherence issues identified.  Patient is not considered high risk. Based on patient feedback/results of the assessment, the therapy is still appropriate.  No medication-related problem(s) or patient need(s) identified that would require a care plan. Follow up in 180 days   2. Immunizations  Immunization status: up to date and documented, missing doses of COVID. Patient with questions about COVID vaccine and if safe with Humira. Educated on [address] with available vaccines and encouraged her to complete vaccination   3. Drug Interactions  None  4. Other Identified Potential Issues  Discussed with patient the Pharmacist Collaborative Practice Agreement.   Patient provided verbal and/or electronic (ex. [employer]) consent to participate in the collaborative practice agreement between the pharmacist and referred patient. This is in lieu of paper consent due to COVID-19 precautions and the use of remote/virtual visits. PLAN  Goals of therapy, common side effects, medication storage, and administration reviewed with patient. continue Humira 40mg every 14 days as prescribed    Recommended lab monitoring: None - completed and appropriate   Recommended vaccinations: COVID vaccination   Keep all scheduled appointments. Return to clinic in 1 year(s). or call with any questions or concerns.      Chanel Finney [facility], PharmD, BCPS  Ambulatory Clinical Pharmacist   5/24/2021 9:20 AM

## 2021-08-27 ENCOUNTER — HOSPITAL ENCOUNTER (OUTPATIENT)
Age: 41
Discharge: HOME | End: 2021-08-27
Payer: COMMERCIAL

## 2021-08-27 LAB
ALBUMIN SERPL-MCNC: 4.2 G/DL (ref 3.5–5.2)
ALP BLD-CCNC: 55 U/L (ref 35–104)
ALT SERPL-CCNC: 14 U/L (ref 5–33)
AST SERPL-CCNC: 21 U/L
C-REACTIVE PROTEIN: 6.4 MG/L (ref 0–5)
CALCIUM SERPL-MCNC: 9.4 MG/DL (ref 8.6–10.4)
CREAT SERPL-MCNC: 0.84 MG/DL (ref 0.5–0.9)
GFR AFRICAN AMERICAN: >60 ML/MIN
GFR NON-AFRICAN AMERICAN: >60 ML/MIN
GFR SERPL CREATININE-BSD FRML MDRD: NORMAL ML/MIN/{1.73_M2}
GFR SERPL CREATININE-BSD FRML MDRD: NORMAL ML/MIN/{1.73_M2}
HCT VFR BLD CALC: 43.2 % (ref 36.3–47.1)
HEMOGLOBIN: 13.9 G/DL (ref 11.9–15.1)
MCH RBC QN AUTO: 28.5 PG (ref 25.2–33.5)
MCHC RBC AUTO-ENTMCNC: 32.2 G/DL (ref 28.4–34.8)
MCV RBC AUTO: 88.5 FL (ref 82.6–102.9)
NRBC AUTOMATED: 0 PER 100 WBC
PDW BLD-RTO: 12.2 % (ref 11.8–14.4)
PLATELET # BLD: 332 K/UL (ref 138–453)
PMV BLD AUTO: 10.7 FL (ref 8.1–13.5)
RBC # BLD: 4.88 M/UL (ref 3.95–5.11)
SEDIMENTATION RATE, ERYTHROCYTE: 18 MM (ref 0–20)
WBC # BLD: 5.8 K/UL (ref 3.5–11.3)

## 2021-08-27 PROCEDURE — 84075 ASSAY ALKALINE PHOSPHATASE: CPT

## 2021-08-27 PROCEDURE — 85027 COMPLETE CBC AUTOMATED: CPT

## 2021-08-27 PROCEDURE — 86140 C-REACTIVE PROTEIN: CPT

## 2021-08-27 PROCEDURE — 85652 RBC SED RATE AUTOMATED: CPT

## 2021-08-27 PROCEDURE — 82040 ASSAY OF SERUM ALBUMIN: CPT

## 2021-08-27 PROCEDURE — 82565 ASSAY OF CREATININE: CPT

## 2021-08-27 PROCEDURE — 84450 TRANSFERASE (AST) (SGOT): CPT

## 2021-08-27 PROCEDURE — 84460 ALANINE AMINO (ALT) (SGPT): CPT

## 2021-08-27 PROCEDURE — 36415 COLL VENOUS BLD VENIPUNCTURE: CPT

## 2021-08-27 PROCEDURE — 82310 ASSAY OF CALCIUM: CPT

## 2021-12-27 ENCOUNTER — HOSPITAL ENCOUNTER (OUTPATIENT)
Age: 41
Discharge: HOME | End: 2021-12-27
Payer: COMMERCIAL

## 2021-12-27 LAB
ALBUMIN SERPL-MCNC: 4.7 G/DL (ref 3.5–5.2)
ALP BLD-CCNC: 60 U/L (ref 35–104)
ALT SERPL-CCNC: 14 U/L (ref 5–33)
AST SERPL-CCNC: 18 U/L
C-REACTIVE PROTEIN: 6.8 MG/L (ref 0–5)
CALCIUM SERPL-MCNC: 9.3 MG/DL (ref 8.6–10.4)
CREAT SERPL-MCNC: 0.76 MG/DL (ref 0.5–0.9)
GFR AFRICAN AMERICAN: >60 ML/MIN
GFR NON-AFRICAN AMERICAN: >60 ML/MIN
GFR SERPL CREATININE-BSD FRML MDRD: NORMAL ML/MIN/{1.73_M2}
GFR SERPL CREATININE-BSD FRML MDRD: NORMAL ML/MIN/{1.73_M2}
HCT VFR BLD CALC: 44.1 % (ref 36.3–47.1)
HEMOGLOBIN: 14.7 G/DL (ref 11.9–15.1)
MCH RBC QN AUTO: 29.1 PG (ref 25.2–33.5)
MCHC RBC AUTO-ENTMCNC: 33.3 G/DL (ref 28.4–34.8)
MCV RBC AUTO: 87.3 FL (ref 82.6–102.9)
NRBC AUTOMATED: 0 PER 100 WBC
PDW BLD-RTO: 12.7 % (ref 11.8–14.4)
PLATELET # BLD: 320 K/UL (ref 138–453)
PMV BLD AUTO: 10.8 FL (ref 8.1–13.5)
RBC # BLD: 5.05 M/UL (ref 3.95–5.11)
SEDIMENTATION RATE, ERYTHROCYTE: 25 MM (ref 0–20)
WBC # BLD: 6.3 K/UL (ref 3.5–11.3)

## 2021-12-27 PROCEDURE — 85027 COMPLETE CBC AUTOMATED: CPT

## 2021-12-27 PROCEDURE — 82565 ASSAY OF CREATININE: CPT

## 2021-12-27 PROCEDURE — 36415 COLL VENOUS BLD VENIPUNCTURE: CPT

## 2021-12-27 PROCEDURE — 85652 RBC SED RATE AUTOMATED: CPT

## 2021-12-27 PROCEDURE — 84450 TRANSFERASE (AST) (SGOT): CPT

## 2021-12-27 PROCEDURE — 84075 ASSAY ALKALINE PHOSPHATASE: CPT

## 2021-12-27 PROCEDURE — 82310 ASSAY OF CALCIUM: CPT

## 2021-12-27 PROCEDURE — 84460 ALANINE AMINO (ALT) (SGPT): CPT

## 2021-12-27 PROCEDURE — 86140 C-REACTIVE PROTEIN: CPT

## 2021-12-27 PROCEDURE — 82040 ASSAY OF SERUM ALBUMIN: CPT

## 2022-04-04 ENCOUNTER — TELEPHONE (OUTPATIENT)
Dept: INTERNAL MEDICINE | Age: 42
End: 2022-04-04

## 2022-04-04 NOTE — TELEPHONE ENCOUNTER
SMS f/u scheduled for 4/8/2022 with Virginia Mariano. Notes requested from specialist's office via fax.

## 2022-04-04 NOTE — LETTER
[address] [facility],4Th Floor  [address], [address]            22       Dr. Jesus Ratliff,     I am a pharmacist with the Specialty Medication Service offered through [facility] ([facility]) and we have mutual patient: Chelsie Mccord ( 1980). My last telephone appointment with the patient was on 2021 to discuss their Humira. The note we have on file dated 2021 indicates the patient was to return in four months for a follow-up. If the patient has been evaluated since this date, please fax any office visit notes to our office at [phone] so we can add it to the patients chart with [facility] ([facility]).            Thank you in advance,       Gudelia Payton, PharmD, [address] Pharmacist  Specialty Medication Service  Telephone: [phone] option 4   Fax: [phone]  Email: [email or web address]

## 2022-04-08 ENCOUNTER — PHARMACY VISIT (OUTPATIENT)
Dept: INTERNAL MEDICINE | Age: 42
End: 2022-04-08

## 2022-04-08 DIAGNOSIS — L40.50 PSORIATIC ARTHRITIS (HCC): Primary | ICD-10-CM

## 2022-04-08 PROCEDURE — 99999 PR OFFICE/OUTPT VISIT,PROCEDURE ONLY: CPT | Performed by: PHARMACIST

## 2022-04-08 RX ORDER — ACETAMINOPHEN 325 MG/1
650 TABLET ORAL EVERY 6 HOURS PRN
COMMUNITY

## 2022-04-08 RX ORDER — ADALIMUMAB 40MG/0.4ML
40 KIT SUBCUTANEOUS
Qty: 2 EACH | Refills: 12 | Status: SHIPPED | OUTPATIENT
Start: 2022-04-08 | End: 2022-09-13 | Stop reason: SDUPTHER

## 2022-04-08 ASSESSMENT — PROMIS GLOBAL HEALTH SCALE
SUM OF RESPONSES TO QUESTIONS 2, 4, 5, & 10: 14
IN GENERAL, HOW WOULD YOU RATE YOUR MENTAL HEALTH, INCLUDING YOUR MOOD AND YOUR ABILITY TO THINK [ON A SCALE OF 1 (POOR) TO 5 (EXCELLENT)]?: 3
HOW IS THE PROMIS V1.1 BEING ADMINISTERED?: 1
WHO IS THE PERSON COMPLETING THE PROMIS V1.1 SURVEY?: 0
IN THE PAST 7 DAYS, HOW WOULD YOU RATE YOUR FATIGUE ON AVERAGE [ON A SCALE FROM 1 (NONE) TO 5 (VERY SEVERE)]?: 5
IN GENERAL, HOW WOULD YOU RATE YOUR SATISFACTION WITH YOUR SOCIAL ACTIVITIES AND RELATIONSHIPS [ON A SCALE OF 1 (POOR) TO 5 (EXCELLENT)]?: 3
IN THE PAST 7 DAYS, HOW WOULD YOU RATE YOUR PAIN ON AVERAGE [ON A SCALE FROM 0 (NO PAIN) TO 10 (WORST IMAGINABLE PAIN)]?: 3
SUM OF RESPONSES TO QUESTIONS 3, 6, 7, & 8: 17
IN GENERAL, HOW WOULD YOU RATE YOUR PHYSICAL HEALTH [ON A SCALE OF 1 (POOR) TO 5 (EXCELLENT)]?: 4
IN GENERAL, WOULD YOU SAY YOUR QUALITY OF LIFE IS...[ON A SCALE OF 1 (POOR) TO 5 (EXCELLENT)]: 5
IN GENERAL, WOULD YOU SAY YOUR HEALTH IS...[ON A SCALE OF 1 (POOR) TO 5 (EXCELLENT)]: 4
IN GENERAL, PLEASE RATE HOW WELL YOU CARRY OUT YOUR USUAL SOCIAL ACTIVITIES (INCLUDES ACTIVITIES AT HOME, AT WORK, AND IN YOUR COMMUNITY, AND RESPONSIBILITIES AS A PARENT, CHILD, SPOUSE, EMPLOYEE, FRIEND, ETC) [ON A SCALE OF 1 (POOR) TO 5 (EXCELLENT)]?: 3
TO WHAT EXTENT ARE YOU ABLE TO CARRY OUT YOUR EVERYDAY PHYSICAL ACTIVITIES SUCH AS WALKING, CLIMBING STAIRS, CARRYING GROCERIES, OR MOVING A CHAIR [ON A SCALE OF 1 (NOT AT ALL) TO 5 (COMPLETELY)]?: 5
IN THE PAST 7 DAYS, HOW OFTEN HAVE YOU BEEN BOTHERED BY EMOTIONAL PROBLEMS, SUCH AS FEELING ANXIOUS, DEPRESSED, OR IRRITABLE [ON A SCALE FROM 1 (NEVER) TO 5 (ALWAYS)]?: 3

## 2022-04-08 NOTE — PROGRESS NOTES
Specialty Medication Service    Patient's Name: Su Vidales YOB: 1980            Reason for visit: Su Vidales is a 39 y.o. female presenting today for Specialty Medication Service visit follow up. Patient last seen by [facility] 5/24/2021. Patient continues on SMS formulary medication, Humira. Pharmacy completed Specialty Medication Service visit for medication monitoring and counseling. Medication list updated. Specialty Medication: Humira 40MG/0.4ML Pen   Frequency: Every 14 days  Indication: Psoriatic arthritis/Psoriasis  Initially Diagnosed: ~2016  Additional Therapy:   · N/A  Previous Therapy:   · Methotrexate + Folic acid (discontinued d/t oral ulcers)      Specialist:   Ayla Fink MD  [facility]  [address]  [phone]  Specialist Progress Note Available: No, requested not received   Last Specialist Visit: 12/27/2021 - based on last completed labs     Allergies   Allergen Reactions    Pcn [Penicillins] Rash    Doxycycline Dermatitis       Past Medical History:   Diagnosis Date    Asthma     Osteoarthritis     Psoriasis       Social History     Tobacco Use    Smoking status: Never Smoker    Smokeless tobacco: Never Used   Substance Use Topics    Alcohol use: No     Family History   Problem Relation Age of Onset    Heart Attack Mother     Diabetes Father     Diabetes Brother     Heart Disease Maternal Grandmother     Diabetes Maternal Grandmother     No Known Problems Maternal Grandfather     Heart Attack Paternal Grandmother     Heart Attack Paternal Grandfather        INTERM HISTORY  Have you been diagnosed with any additional conditions since we last talked? no  Have you developed any new allergies since we last talked? no  Have you stopped taking any medications or supplements since we last talked?  yes, Montelukast   Have you started taking any additional medications or supplements since we last talked? no    REVIEW OF CURRENT DISEASE STATE  Psoriatic Arthritis: Patient with diagnosis of psoriatic arthritis. Symptoms have been present for several years. Current symptoms include joint pain and are of mild severity. Symptoms are made worse by: rainy weather. Symptoms are helped by arthritis medications and movement. Overall disease activity:  stable. Limitation on activities include none. The patient reports symptoms of scaling, primarily affecting the legs. Lesions appear to be exacerbated by no known precipitant. Treatments tried so far include none - watchful waiting approach. · Are you currently having a flare? no  · Considering all the ways in which this condition and others affects you, how are you doing (0 = very well, 10 = very poorly)? 2  · How would you rate your pain on average? (0 = no pain, 10 = worst pain imaginable) 3  · During the past 4 weeks, have you missed any planned activities of daily living due to condition (work/school/other plans)? No  · During the past 4 weeks, have you had to seek urgent care, ER admission, Unplanned doctor office visit, or hospital admission? No    MEDICATIONS  Current Outpatient Medications   Medication Sig Dispense Refill    Cholecalciferol (VITAMIN D3) 125 MCG (5000 UT) TABS Take 2 tablets by mouth daily      Adalimumab (HUMIRA PEN) 40 MG/0.4ML PNKT Inject 40 mg into the skin every 14 days 2 each 12    tiZANidine (ZANAFLEX) 4 MG tablet Take 4 mg by mouth 3 times daily as needed  1    budesonide-formoterol (SYMBICORT) 160-4.5 MCG/ACT AERO Inhaler 2 puffs by mouth (7 minutes apart) two times daily (about every 12 hours)      azelastine (ASTELIN) 0.1 % nasal spray 1 spray by Nasal route 2 times daily Use in each nostril as directed 15 minutes before fluticasone nasal spray.       montelukast (SINGULAIR) 10 MG tablet Take 1 tablet by mouth nightly (Patient taking differently: Take 10 mg by mouth nightly as needed ) 30 tablet 3    albuterol sulfate HFA (VENTOLIN HFA) 108 (90 Base) MCG/ACT inhaler INHALE 1 PUFFS INTO THE LUNGS 2 TIMES DAILY 18 g 0    celecoxib (CELEBREX) 200 MG capsule Take 200 mg by mouth 2 times daily as needed for Pain        No current facility-administered medications for this visit. Current Specialty Medication:   Adalimumab (Humira)   Psoriatic Arthritis: 40 mg every other week  Warnings to monitor for: Anaphylaxis/hypersensitivity reactions, Positive antinuclear antibody titers (even with negative baseline), Demyelinating CNS disease (new-onset or exacerbation), Heart failure (Worsening or new-onset), pancytopenia and aplastic anemia, reactivation of hepatitis B (HBV), Infections: [US Boxed Warning], Malignancy: [US Boxed Warning], Tuberculosis: [US Boxed Warning]  Recommended monitoring: Monitor improvement of symptoms and physical function assessments, signs/symptoms/worsening of heart failure; TB screening (every 6-12 months dependent upon length of therapy and other risk factors), CBC with differential (every 6 weeks for 3 months then annually); LFTs (Annually); HBV screening (annual); signs/symptoms of malignancy (eg, splenomegaly, hepatomegaly, abdominal pain, persistent fever, night sweats, weight loss). Storage: Refrigerated at 36°F to 46°F (2°C to 8°C). DO NOT FREEZE. Do not use if frozen even if it has been thawed. Handling: May store at room temperature (If needed) for up to 14 days with protection from light. Discard if not used within the 14-day period. (keep track by writing down date removed from refrigerator). Do not use beyond the expiration date on the container.    Patient Reported Side Effects: None at this time     Specialty Medication Start Date: ~2016  Appropriate Dose: Yes  Last tuberculin Test: 5/17/2021   - Result: negative    Describe your medication adherence over the last 4 weeks: Very Good  How many doses have you missed in the last 4 weeks, if any? 0  How confident are you to follow the injection process and the treatment plan? (0-10) 10 Who injects? self  Does the patient have a current infection of any kind? No    Contraindications to therapy present? No    Drug Interactions:  No clinically significant interactions identified via [employer] Interaction Analysis as category D or higher.  _____________________________________________________________________  Renal Dosing:  Creatinine Clearance: CrCl cannot be calculated (Unknown ideal weight.). No renal adjustments necessary. LABS  Lab Results   Component Value Date    BUN 12 08/24/2020    CREATININE 0.76 12/27/2021     Lab Results   Component Value Date    WBC 6.3 12/27/2021    HGB 14.7 12/27/2021    HCT 44.1 12/27/2021    RBC 5.05 12/27/2021     12/27/2021     Lab Results   Component Value Date    ALKPHOS 60 12/27/2021    ALT 14 12/27/2021    AST 18 12/27/2021    BILITOT 0.26 08/24/2020       IMMUNIZATIONS  Immunization History   Administered Date(s) Administered    COVID-19, J&J, PF, 0.5 mL 12/02/2021    Hepatitis B Adult (Engerix-B) 07/09/2018, 08/13/2018, 01/10/2019    Influenza Vaccine, unspecified formulation 02/09/2018, 01/21/2019    Influenza Virus Vaccine 02/01/2019    Influenza, Quadv, 6 mo and older, IM (Fluzone, Flulaval) 02/09/2018, 01/21/2019    Influenza, [place] Gaby, IM, PF (6 mo and older Fluzone, Flulaval, Fluarix, and 3 yrs and older Afluria) 10/04/2019, 10/06/2020    MMR 07/16/2018, 08/13/2018    PPD Test 06/17/2019, 06/08/2020, 05/17/2021    Pneumococcal Polysaccharide (Rdiagdtih38) 06/18/2018    Pneumococcal Vaccine 02/01/2019    Tdap (Boostrix, Adacel) 02/09/2018      Immunization status: up to date and documented.     ASSESSMENTS  Fall Risk 4/8/2022 5/24/2021   2 or more falls in past year? no no   Fall with injury in past year? no no     PROMIS V1.1 Global Health 4/8/2022 5/24/2021   In general, would you say your health is: 4 3   In general, would you say your quality of life is: 5 4   In general, how would you rate your physical health? 4 3   In general, how would you rate your mental health, including your mood and your ability to think? 3 3   In general, how would you rate your satisfaction with your social activities and relationships? 3 3   In general, please rate how well you carry out your usual social activities and roles. (This includes activities at home, at work and in your community, and responsibilities as a parent, child, spouse, employee, friend, etc.) 3 3   To what extent are you able to carry out your everyday physical activities such as walking, climbing stairs, carrying groceries, or moving a chair? 5 5   In the past 7 days how often have you been bothered by emotional problems such as feeling anxious, depressed or irritable? 3 1   In the past 7 days how would you rate your fatigue on average? 5 4   In the past 7 days how would you rate your pain on average? 3 5   My BACK PAIN at the moment is - 2   Mode of Collection 1 1   Person Completing Survey 0 0   PROMIS Physical Score 17 17   PROMIS Mental Score 14 11       1. Psoriatic Arthritis   Devika Murphy is a 39 y.o. female being treated for Psoriatic Arthritis. · Medication Reconciliation completed, no drug-drug interactions identified. Allergy and diagnosis info reviewed and updated. Devika Murphy has a history remarkable for the following conditions: Asthma; PsA; Vitamin D deficiency   · Current therapy includes: Humira 40mg every 14 days   Medication Effectiveness: Patient disease is  well controlled on current therapy. Stable disease with reported efficacy of Humira   Patient had no questions regarding the medication's warnings, precautions, and contraindications. Confirmed appropriate storage and disposal.   Patient had no concerns with the administration process.    Current disease state symptoms include: Mild joint pain (hands) and new PsO spot on R Leg (not tried anything at this time because not bothersome)    No side effects/adverse events reported, and no adherence issues identified.  Functional and cognitive limitations include: none   Patient is not considered high risk. Based on patient feedback/results of the assessment, the therapy is  still appropriate.  No medication-related problem(s) or patient need(s) identified that would require a care plan. Follow up in 180 days     2. Immunizations   Immunization status: up to date and documented. 3. Drug Interactions   None    4. Other Identified Potential Issues   PsO Spot on R Leg: Recommended she start with daily moisturizer after shower to help with new \"spot\" on her leg she is watching. Discussed importance of monitoring for changes and to let Dr. Kristine Langley know about the spot for evaluation. Stated understanding of recommendation      PLAN  Goals of therapy, common side effects, medication storage, and administration reviewed with patient. continue Humira 40mg every 14 days as prescribed    Recommended lab monitoring: none at this time; completed with Dr. Kristine Langley   Recommended vaccinations: None at this time; up to date   Keep all scheduled appointments. Return to clinic in 1 year(s). or call with any questions or concerns. DARCY Quispe RONAN [facility], PharmD, BCPS  Ambulatory Clinical Pharmacist   2022 9:22 AM    For Pharmacy [id number] in place:   Yes   Recommendation Provided To: Patient/Caregiver: 2 via Telephone   Intervention Detail: Adherence Monitorin and Refill(s) Provided   Intervention Accepted By: Patient/Caregiver: 2   Time Spent (min): 30

## 2022-05-03 ENCOUNTER — NURSE ONLY (OUTPATIENT)
Dept: PRIMARY CARE CLINIC | Age: 42
End: 2022-05-03
Payer: COMMERCIAL

## 2022-05-03 DIAGNOSIS — Z11.1 ENCOUNTER FOR PPD TEST: Primary | ICD-10-CM

## 2022-05-03 PROCEDURE — 86580 TB INTRADERMAL TEST: CPT | Performed by: INTERNAL MEDICINE

## 2022-05-05 ENCOUNTER — NURSE ONLY (OUTPATIENT)
Dept: PRIMARY CARE CLINIC | Age: 42
End: 2022-05-05
Payer: COMMERCIAL

## 2022-05-05 DIAGNOSIS — Z11.1 ENCOUNTER FOR PPD TEST: Primary | ICD-10-CM

## 2022-05-05 LAB
INDURATION: NEGATIVE
TB SKIN TEST: NORMAL

## 2022-05-05 PROCEDURE — 99211 OFF/OP EST MAY X REQ PHY/QHP: CPT | Performed by: INTERNAL MEDICINE

## 2022-05-05 NOTE — LETTER
[address] [address]  Phone: [phone]  Fax: [phone]    Maty Wooten MD        May 5, 2022    Patient: Peggy Hammond   YOB: 1980   Date of Visit: 5/5/2022       To Whom It May Concern:    Our clinic recently performed a tuberculosis skin test on one of your employees, Vania Garcia on 5/3/22. The results of this test are as follows:    Lab Results   Component Value Date    PPD 00mm 05/05/2022    INDURATION negative 05/05/2022     This test was negative for tuberculosis exposure per current Centers for Disease Control guidelines. A chest x-ray was not required. If you have any questions or concerns, please don't hesitate to call.     Sincerely,        Maty Wooten MD

## 2022-05-05 NOTE — PROGRESS NOTES
PPD Reading Note  PPD read and results entered in [address]. Result: 00 mm induration.   Interpretation: Negative  If test not read within 48-72 hours of initial placement, patient advised to repeat in other arm 1-3 weeks after this test.  Allergic reaction: no

## 2022-06-03 ENCOUNTER — HOSPITAL ENCOUNTER (OUTPATIENT)
Age: 42
Discharge: HOME | End: 2022-06-03
Payer: COMMERCIAL

## 2022-06-03 LAB
ALBUMIN SERPL-MCNC: 4.3 G/DL (ref 3.5–5.2)
ALP BLD-CCNC: 61 U/L (ref 35–104)
ALT SERPL-CCNC: 16 U/L (ref 5–33)
AST SERPL-CCNC: 18 U/L
C-REACTIVE PROTEIN: 10 MG/L (ref 0–5)
CALCIUM SERPL-MCNC: 9.2 MG/DL (ref 8.6–10.4)
CREAT SERPL-MCNC: 0.85 MG/DL (ref 0.5–0.9)
GFR AFRICAN AMERICAN: >60 ML/MIN
GFR NON-AFRICAN AMERICAN: >60 ML/MIN
GFR SERPL CREATININE-BSD FRML MDRD: NORMAL ML/MIN/{1.73_M2}
HCT VFR BLD CALC: 44 % (ref 36.3–47.1)
HEMOGLOBIN: 14.4 G/DL (ref 11.9–15.1)
MCH RBC QN AUTO: 29 PG (ref 25.2–33.5)
MCHC RBC AUTO-ENTMCNC: 32.7 G/DL (ref 28.4–34.8)
MCV RBC AUTO: 88.5 FL (ref 82.6–102.9)
NRBC AUTOMATED: 0 PER 100 WBC
PDW BLD-RTO: 13 % (ref 11.8–14.4)
PLATELET # BLD: 328 K/UL (ref 138–453)
PMV BLD AUTO: 11 FL (ref 8.1–13.5)
RBC # BLD: 4.97 M/UL (ref 3.95–5.11)
SEDIMENTATION RATE, ERYTHROCYTE: 20 MM/HR (ref 0–20)
WBC # BLD: 7 K/UL (ref 3.5–11.3)

## 2022-06-03 PROCEDURE — 82565 ASSAY OF CREATININE: CPT

## 2022-06-03 PROCEDURE — 84450 TRANSFERASE (AST) (SGOT): CPT

## 2022-06-03 PROCEDURE — 36415 COLL VENOUS BLD VENIPUNCTURE: CPT

## 2022-06-03 PROCEDURE — 82310 ASSAY OF CALCIUM: CPT

## 2022-06-03 PROCEDURE — 85027 COMPLETE CBC AUTOMATED: CPT

## 2022-06-03 PROCEDURE — 84075 ASSAY ALKALINE PHOSPHATASE: CPT

## 2022-06-03 PROCEDURE — 85652 RBC SED RATE AUTOMATED: CPT

## 2022-06-03 PROCEDURE — 86140 C-REACTIVE PROTEIN: CPT

## 2022-06-03 PROCEDURE — 84460 ALANINE AMINO (ALT) (SGPT): CPT

## 2022-06-03 PROCEDURE — 82040 ASSAY OF SERUM ALBUMIN: CPT

## 2022-09-09 ENCOUNTER — TELEPHONE (OUTPATIENT)
Dept: INTERNAL MEDICINE | Age: 42
End: 2022-09-09

## 2022-09-09 NOTE — LETTER
[address] [facility],4[address] Floor  [address], [address]            22       Dr. Ramirez Durbin,     I am a pharmacist with the Specialty Medication Service offered through [facility] ([facility]) and we have mutual patient: Malorie Acuña ( 1980). I am scheduled to speak with the patient on  to discuss their Humira. If the patient has a signed Release of Information form on file, please fax any office visit notes to our office at [phone] so we can add it to the patients chart with [facility] ([facility]).            Thank you in advance,       Rowena Vasquez, PharmD, [address] Pharmacist  Specialty Medication Service  Telephone: [phone] option 4   Fax: [phone]  Email: [email or web address]

## 2022-09-09 NOTE — TELEPHONE ENCOUNTER
Specialty Medication Service    Date: 9/9/2022  Patient's Name: Corine Goldberg YOB: 1980            _____________________________________________________________________________________________    Michelle Sunten with patient  to schedule PharmD follow up appointment for Specialty Medication Services.  Scheduled for 9/13/22 at 9:30am.    Med Lowe PharmD [facility]  Ambulatory Clinical Pharmacist  Specialty Medication Services  Phone: [phone]    For Pharmacy Admin Tracking Only    Intervention Detail: Scheduled Appointment  Time Spent (min): 15

## 2022-09-12 NOTE — PROGRESS NOTES
[facility]    Specialty Medication Service    Patient's Name: Earl Gonzalez YOB: 1980            Reason for visit: Earl Gonzalez is a 39 y.o. female presenting today for Specialty Medication Service visit follow up. Patient last seen by [facility] 4/8/2022. Patient continues on SMS formulary medication, Humira. Pharmacy completed Specialty Medication Service visit for medication monitoring and counseling. Medication list updated. Specialty Medication: Humira 40MG/0.4ML Pen   Frequency: Every 14 days  Indication: Psoriatic arthritis/Psoriasis  Initially Diagnosed: ~2016  Additional Therapy:   Celecoxib  Acetaminophen  Previous Therapy:   Methotrexate + Folic acid (discontinued d/t oral ulcers)      Specialist:   Sara Zamora MD  [facility] [address]  [phone]    Specialist Progress Note Available: No, Requested via fax on 9/12/22, never received  Last Specialist Visit: ~6/2022     Allergies   Allergen Reactions    Pcn [Penicillins] Rash    Doxycycline Dermatitis       Past Medical History:   Diagnosis Date    Asthma     Osteoarthritis     Psoriasis       Social History     Tobacco Use    Smoking status: Never    Smokeless tobacco: Never   Substance Use Topics    Alcohol use: No     Family History   Problem Relation Age of Onset    Heart Attack Mother     Diabetes Father     Diabetes Brother     Heart Disease Maternal Grandmother     Diabetes Maternal Grandmother     No Known Problems Maternal Grandfather     Heart Attack Paternal Grandmother     Heart Attack Paternal Grandfather        INTERM HISTORY  Have you been diagnosed with any additional conditions since we last talked? no  Have you developed any new allergies since we last talked? no  Have you stopped taking any medications or supplements since we last talked? no  Have you started taking any additional medications or supplements since we last talked?  no    REVIEW OF CURRENT DISEASE STATE  Psoriatic Arthritis: Patient with diagnosis of psoriatic arthritis. Symptoms have been present for several years. Current arthritis symptoms include joint pain and morning stiffness and are of mild severity. Joints in her hands/fingers are the most bothersome, pain 1-2/10 on average, pain 4/10 at its worst (scale 0-10, 10 is the worst pain imaginable). Morning stiffness lasting 15 to 20 minutes. Symptoms are made worse by: cold exposure, resting, and rainy weather . Symptoms are helped by arthritis medications and movement. Overall disease activity:  stable. Limitation on activities include none. The patient reports psoriasis symptoms of itching 3/10 at its worst (scale 0-10, 10 is the worst itch imaginable), scaling, flaking, primarily affecting the scalp. Patient uses tea tree shampoo with mint extract when she has symptoms of psoriasis on her scalp. Lesions appear to be exacerbated by no known precipitant. · Are you currently having a flare? no  · Considering all the ways in which this condition and others affects you, how are you doing (0 = very well, 10 = very poorly)? 1.5  · How would you rate your pain on average? (0 = no pain, 10 = worst pain imaginable) 1.5  · During the past 4 weeks, have you missed any planned activities of daily living due to condition (work/school/other plans)? No  · During the past 4 weeks, have you had to seek urgent care, ER admission, Unplanned doctor office visit, or hospital admission?  No      MEDICATIONS  Current Outpatient Medications   Medication Sig Dispense Refill    acetaminophen (TYLENOL) 325 MG tablet Take 650 mg by mouth every 6 hours as needed for Pain      Adalimumab (HUMIRA PEN) 40 MG/0.4ML PNKT Inject 40 mg into the skin every 14 days 2 each 12    Cholecalciferol (VITAMIN D3) 125 MCG (5000 UT) TABS Take 2 tablets by mouth daily      tiZANidine (ZANAFLEX) 4 MG tablet Take 4 mg by mouth 3 times daily as needed  1    budesonide-formoterol (SYMBICORT) 160-4.5 MCG/ACT AERO Inhaler 2 puffs by mouth (7 minutes apart) two times daily (about every 12 hours)      azelastine (ASTELIN) 0.1 % nasal spray 1 spray by Nasal route 2 times daily Use in each nostril as directed 15 minutes before fluticasone nasal spray. montelukast (SINGULAIR) 10 MG tablet Take 1 tablet by mouth nightly (Patient taking differently: Take 10 mg by mouth nightly as needed) 30 tablet 3    albuterol sulfate HFA (VENTOLIN HFA) 108 (90 Base) MCG/ACT inhaler INHALE 1 PUFFS INTO THE LUNGS 2 TIMES DAILY 18 g 0    celecoxib (CELEBREX) 200 MG capsule Take 200 mg by mouth 2 times daily as needed for Pain        No current facility-administered medications for this visit. Current Specialty Medication: Adalimumab (Humira)   Psoriatic Arthritis: 40 mg every other week  Warnings to monitor for: Anaphylaxis/hypersensitivity reactions, Positive antinuclear antibody titers (even with negative baseline), Demyelinating CNS disease (new-onset or exacerbation), Heart failure (Worsening or new-onset), pancytopenia and aplastic anemia, reactivation of hepatitis B (HBV), Infections: [US Boxed Warning], Malignancy: [US Boxed Warning], Tuberculosis: [US Boxed Warning]  Recommended monitoring: Monitor improvement of symptoms and physical function assessments, signs/symptoms/worsening of heart failure; TB screening (every 6-12 months dependent upon length of therapy and other risk factors), CBC with differential (every 6 weeks for 3 months then annually); LFTs (Annually); HBV screening (annual); signs/symptoms of malignancy (eg, splenomegaly, hepatomegaly, abdominal pain, persistent fever, night sweats, weight loss). Storage: Refrigerated at 36°F to 46°F (2°C to 8°C). DO NOT FREEZE. Do not use if frozen even if it has been thawed. Handling: May store at room temperature (If needed) for up to 14 days with protection from light. Discard if not used within the 14-day period.  (keep track by writing down date removed from refrigerator). Do not use beyond the expiration date on the container. Patient Reported Side Effects: None  Specialty Medication Start Date: ~2016  Appropriate Dose: Yes  Last tuberculin Test: 5/3/2022   - Result: negative    Describe your medication adherence over the last 4 weeks: Excellent  How many doses have you missed in the last 4 weeks, if any? 0  How confident are you to follow the injection process and the treatment plan? (0-10) 10 Who injects? Self  Does the patient have a current infection of any kind? No  Contraindications to therapy present? No    Drug Interactions:  No clinically significant interactions identified via [employer] Interaction Analysis as category D or higher.  _____________________________________________________________________  Renal Dosing:  Creatinine Clearance: CrCl cannot be calculated (Unknown ideal weight.). No renal adjustments necessary.     LABS  Lab Results   Component Value Date/Time    BUN 12 08/24/2020 09:04 PM    CREATININE 0.85 06/03/2022 10:12 AM     Lab Results   Component Value Date/Time    WBC 7.0 06/03/2022 10:12 AM    HGB 14.4 06/03/2022 10:12 AM    HCT 44.0 06/03/2022 10:12 AM    RBC 4.97 06/03/2022 10:12 AM     06/03/2022 10:12 AM     Lab Results   Component Value Date/Time    ALKPHOS 61 06/03/2022 10:12 AM    ALT 16 06/03/2022 10:12 AM    AST 18 06/03/2022 10:12 AM    BILITOT 0.26 08/24/2020 09:04 PM       IMMUNIZATIONS  Immunization History   Administered Date(s) Administered    COVID-19, J&J, (age 18y+), IM, 0.5 mL 12/02/2021    Hepatitis B Adult (Engerix-B) 07/09/2018, 08/13/2018, 01/10/2019    Influenza Vaccine, unspecified formulation 02/09/2018, 01/21/2019    Influenza Virus Vaccine 02/01/2019    Influenza, FLUARIX, FLULAVAL, FLUZONE (age 10 mo+) AND AFLURIA, (age 1 y+), PF, 0.5mL 10/04/2019, 10/06/2020    Influenza, Quadv, 6 mo and older, IM (Fluzone, Flulaval) 02/09/2018, 01/21/2019    MMR 07/16/2018, 08/13/2018    PPD Test 06/17/2019, 06/08/2020, 05/17/2021, 05/03/2022    Pneumococcal Polysaccharide ([id number]) 06/18/2018    Pneumococcal Vaccine 02/01/2019    Tdap (Boostrix, Adacel) 02/09/2018      Immunization status: missing doses of Dedwyvs16, Shingrix, Flu, COVID booster. ASSESSMENTS  Fall Risk 9/13/2022 4/8/2022 5/24/2021   2 or more falls in past year? no no no   Fall with injury in past year? no no no     PROMIS V1.1 Global Health 4/8/2022 5/24/2021   In general, would you say your health is: 4 3   In general, would you say your quality of life is: 5 4   In general, how would you rate your physical health? 4 3   In general, how would you rate your mental health, including your mood and your ability to think? 3 3   In general, how would you rate your satisfaction with your social activities and relationships? 3 3   In general, please rate how well you carry out your usual social activities and roles. (This includes activities at home, at work and in your community, and responsibilities as a parent, child, spouse, employee, friend, etc.) 3 3   To what extent are you able to carry out your everyday physical activities such as walking, climbing stairs, carrying groceries, or moving a chair? 5 5   In the past 7 days how often have you been bothered by emotional problems such as feeling anxious, depressed or irritable? 3 1   In the past 7 days how would you rate your fatigue on average? 5 4   In the past 7 days how would you rate your pain on average? 3 5   My BACK PAIN at the moment is - 2   Mode of Collection 1 1   Person Completing Survey 0 0   PROMIS Physical Score 17 17   PROMIS Mental Score 14 11       Psoriatic Arthritis  Thais Brunner is a 39 y.o. female being treated for Psoriatic Arthritis. Medication Reconciliation completed with the patient, no drug-drug interactions identified. Allergy and diagnosis info reviewed and updated.  Thais Brunner has a history remarkable for the following conditions: Psoriatic arthritis, asthma  Current therapy includes: Humira 40mg every 14 days, celecoxib 200mg prn, acetaminophen 325mg prn  Medication Effectiveness: Patient disease is  well controlled on current therapy. Patient had no questions regarding the medication's warnings, precautions, and contraindications. Confirmed appropriate storage and disposal.  Patient had no concerns with the administration process. Current disease state symptoms include: Current arthritis symptoms include joint pain and morning stiffness and are of mild severity. Joints in her hands/fingers are the most bothersome, pain 1-2/10 on average, pain 4/10 at its worst (scale 0-10, 10 is the worst pain imaginable). Morning stiffness lasting 15 to 20 minutes. The patient reports psoriasis symptoms of itching 3/10 at its worst (scale 0-10, 10 is the worst itch imaginable), scaling, flaking, primarily affecting the scalp. No side effects/adverse events reported, and no adherence issues identified. Functional and cognitive limitations include: None  Patient is not considered high risk. Based on patient feedback/results of the assessment, the therapy is  still appropriate. No medication-related problem(s) or patient need(s) identified that would require a care plan. Follow up in 180 days     Immunizations  Immunization status: missing doses of [id number], Shingrix, Flu, COVID booster. Not interested in COVID booster, but will receive other vaccines. Follow-up next [facility] visit. Drug Interactions  No clinically significant interactions identified via [employer] Interaction Analysis as category D or higher. Other Identified Potential Issues  Discussed with patient the Pharmacist Collaborative Practice Agreement. Patient provided verbal and/or electronic (ex. Bridget) consent to participate in the collaborative practice agreement between the pharmacist and referred patient.  This is in lieu of paper consent due to COVID-19 precautions and the use of remote/virtual visits. PLAN  Goals of therapy, common side effects, medication storage, and administration reviewed with patient. continue Humira 40mg every 14 days as prescribed   Recommended lab monitoring: None at this time  Recommended vaccinations: [id number], Shingrix, Flu, COVID booster. Not interested in COVID booster, but will receive other vaccines. Follow-up next [facility] visit. Keep all scheduled appointments. Return to clinic in 6 month(s). or call with any questions or concerns. Darius Quezada, PharmD [facility]  Ambulatory Clinical Pharmacist  Specialty Medication Services  Phone: [phone]      Tor Finney was evaluated through a synchronous (real-time) audio encounter. Patient identification was verified at the start of the visit. She (or guardian if applicable) is aware that this is a billable service, which includes applicable co-pays. This visit was conducted with the patient's (and/or legal guardian's) verbal consent. She has not had a related appointment within my department in the past 7 days or scheduled within the next 24 hours. The patient was located at Home: [address] [place], [place] [id number]. The provider was located at [facility] (Appt Dept): [address],  [address] [place] [address].     Note: not billable if this call serves to triage the patient into an appointment for the relevant concern    [address] in place:  Yes  Recommendation Provided To: Provider: 1 via Note to Provider and Patient/Caregiver: 1 via Telephone  Intervention Detail: Adherence Monitorin, Refill(s) Provided, Scheduled Appointment, and Vaccine Recommended/Administered  Intervention Accepted By: Provider: 1 and Patient/Caregiver: 1  Time Spent (min): 60

## 2022-09-13 ENCOUNTER — PHARMACY VISIT (OUTPATIENT)
Dept: INTERNAL MEDICINE | Age: 42
End: 2022-09-13

## 2022-09-13 DIAGNOSIS — L40.50 PSORIATIC ARTHRITIS (HCC): ICD-10-CM

## 2022-09-13 RX ORDER — ADALIMUMAB 40MG/0.4ML
40 KIT SUBCUTANEOUS
Qty: 2 EACH | Refills: 5 | Status: SHIPPED | OUTPATIENT
Start: 2022-09-13

## 2022-09-13 ASSESSMENT — ROUTINE ASSESSMENT OF PATIENT INDEX DATA (RAPID3)
TOTAL RAPID3 SCORE: 3
ON A SCALE OF ONE TO TEN, HOW MUCH PAIN HAVE YOU HAD BECAUSE OF YOUR CONDITION OVER THE PAST WEEK?: 1.5
ON A SCALE OF ONE TO TEN, CONSIDERING ALL THE WAYS IN WHICH ILLNESS AND HEALTH CONDITIONS MAY AFFECT YOU AT THIS TIME, PLEASE INDICATE BELOW HOW YOU ARE DOING:: 1.5
TOTAL RAPID3 SCORE: 3
ON A SCALE OF ONE TO TEN, HOW DIFFICULT WAS IT FOR YOU TO COMPLETE THE LISTED DAILY PHYSICAL TASKS OVER THE LAST WEEK: 1

## 2023-02-13 ENCOUNTER — TELEPHONE (OUTPATIENT)
Dept: INTERNAL MEDICINE | Age: 43
End: 2023-02-13

## 2023-02-13 NOTE — TELEPHONE ENCOUNTER
Specialty Medication Service    Date: 2/13/2023  Patient's Name: Venkat Gonzalez YOB: 1980            _____________________________________________________________________________________________    Left message to schedule Medical Director  appointment for Specialty Medication Services. Please call: [phone] option 4. Will continue to outreach as appropriate.     Juan Rodney, ConsueloD, [facility]  Ambulatory Clinical Pharmacist   [facility] [place] Specialty Medication Service  Phone: [phone]  [facility]  Phone: [phone] option 1

## 2023-02-15 NOTE — TELEPHONE ENCOUNTER
Specialty Medication Service    Date: 2/15/2023  Patient's Name: Cece Mercado YOB: 1980            _____________________________________________________________________________________________    Left message to schedule Medical Director  appointment for Specialty Medication Services. Please call: [phone] option 4. Will continue to outreach as appropriate.     Jennyfer Porter, PharmD, [facility]  Ambulatory Clinical Pharmacist   [facility] Specialty Medication Service  Phone: [phone]  [facility]  Phone: [phone] option 1

## 2023-02-20 NOTE — TELEPHONE ENCOUNTER
Specialty Medication Service    Date: 2/20/2023  Patient's Name: Alyssa Ventura YOB: 1980            _____________________________________________________________________________________________    Left message to schedule Medical Director  appointment for Specialty Medication Services. Please call: [phone] option 4. Will continue to outreach as appropriate.     Harjeet Villegas, PharmD, [address] Specialty Medication Service  Phone: [phone]  [address] [place] [address]  Phone: [phone] option [address] Only    Program: SMS  CPA in place:  Yes  Recommendation Provided To: Patient/Caregiver: 1 via Telephone  Intervention Detail: Scheduled Appointment  Intervention Accepted By: Patient/Caregiver: 0   Time Spent (min): 20

## 2023-03-03 ENCOUNTER — HOSPITAL ENCOUNTER (OUTPATIENT)
Age: 43
Discharge: HOME | End: 2023-03-03
Payer: COMMERCIAL

## 2023-03-03 LAB
ALBUMIN SERPL-MCNC: 4 G/DL (ref 3.5–5.2)
ALP SERPL-CCNC: 52 U/L (ref 35–104)
ALT SERPL-CCNC: 11 U/L (ref 5–33)
AST SERPL-CCNC: 15 U/L
CALCIUM SERPL-MCNC: 9.1 MG/DL (ref 8.6–10.4)
CREAT SERPL-MCNC: 0.67 MG/DL (ref 0.5–0.9)
CRP SERPL HS-MCNC: 8.5 MG/L (ref 0–5)
ERYTHROCYTE [SEDIMENTATION RATE] IN BLOOD BY WESTERGREN METHOD: 23 MM/HR (ref 0–20)
GFR SERPL CREATININE-BSD FRML MDRD: >60 ML/MIN/1.73M2
HCT VFR BLD AUTO: 44.3 % (ref 36.3–47.1)
HGB BLD-MCNC: 14.5 G/DL (ref 11.9–15.1)
MCH RBC QN AUTO: 29.2 PG (ref 25.2–33.5)
MCHC RBC AUTO-ENTMCNC: 32.7 G/DL (ref 28.4–34.8)
MCV RBC AUTO: 89.3 FL (ref 82.6–102.9)
NRBC AUTOMATED: 0 PER 100 WBC
PDW BLD-RTO: 12.7 % (ref 11.8–14.4)
PLATELET # BLD AUTO: 338 K/UL (ref 138–453)
PMV BLD AUTO: 10.7 FL (ref 8.1–13.5)
RBC # BLD: 4.96 M/UL (ref 3.95–5.11)
WBC # BLD AUTO: 6.6 K/UL (ref 3.5–11.3)

## 2023-03-03 PROCEDURE — 36415 COLL VENOUS BLD VENIPUNCTURE: CPT

## 2023-03-03 PROCEDURE — 84075 ASSAY ALKALINE PHOSPHATASE: CPT

## 2023-03-03 PROCEDURE — 84450 TRANSFERASE (AST) (SGOT): CPT

## 2023-03-03 PROCEDURE — 85652 RBC SED RATE AUTOMATED: CPT

## 2023-03-03 PROCEDURE — 86140 C-REACTIVE PROTEIN: CPT

## 2023-03-03 PROCEDURE — 84460 ALANINE AMINO (ALT) (SGPT): CPT

## 2023-03-03 PROCEDURE — 82040 ASSAY OF SERUM ALBUMIN: CPT

## 2023-03-03 PROCEDURE — 85027 COMPLETE CBC AUTOMATED: CPT

## 2023-03-03 PROCEDURE — 82310 ASSAY OF CALCIUM: CPT

## 2023-03-03 PROCEDURE — 82565 ASSAY OF CREATININE: CPT

## 2023-03-06 ENCOUNTER — TELEPHONE (OUTPATIENT)
Dept: INTERNAL MEDICINE | Age: 43
End: 2023-03-06

## 2023-03-06 NOTE — PROGRESS NOTES
Specialty Medication Service    Patient's Name: Sheng Tidwell YOB: 1980            Reason for visit: Sehng Tidwell is a 43 y.o. female presenting today for Specialty Medication Service visit follow up. Patient last seen by [facility] 9/13/22. Patient continues on SMS formulary medication, Humira. Pharmacy completed Specialty Medication Service visit for medication monitoring and counseling. Medication list updated. Specialty Medication: Humira 40MG/0.4ML Pen   Frequency: Every 14 days  Indication: Psoriatic arthritis/Psoriasis  Initially Diagnosed: ~2016  Additional Therapy:   Celecoxib  Acetaminophen  Tea tree shampoo  Previous Therapy:   Methotrexate + Folic acid (discontinued d/t oral ulcers)      Specialist:   Zac England MD  [facility]  [address]  [phone]  Specialist Progress Note Available: No, requested on 3/6/23 via phone, did not receive. Last Specialist Visit: Unknown     Allergies   Allergen Reactions    Pcn [Penicillins] Rash    Doxycycline Dermatitis       Past Medical History:   Diagnosis Date    Asthma     Osteoarthritis     Psoriasis     Psoriatic arthritis ([address]       Social History     Tobacco Use    Smoking status: Never    Smokeless tobacco: Never   Substance Use Topics    Alcohol use: No     Family History   Problem Relation Age of Onset    Heart Attack Mother     Diabetes Father     Diabetes Brother     Heart Disease Maternal Grandmother     Diabetes Maternal Grandmother     No Known Problems Maternal Grandfather     Heart Attack Paternal Grandmother     Heart Attack Paternal Grandfather        INTERM HISTORY  Have you been diagnosed with any additional conditions since we last talked? no  Have you developed any new allergies since we last talked? no  Have you stopped taking any medications or supplements since we last talked?  yes, Montelukast  Have you started taking any additional medications or supplements since we last talked? no    REVIEW OF CURRENT DISEASE STATE  Psoriatic Arthritis: Patient with diagnosis of psoriatic arthritis. Symptoms have been present for several years. Current arthritis symptoms include joint pain in her hands 3/10 (scale 0-10, 10 is the worst pain imaginable), joint swelling in her hands when the weather is bad, fatigue 5/10 (scale 0-10, 10 is the worst fatigue imaginable), and morning stiffness (~5 minutes in her hands) severity. Symptoms are made worse by: cold exposure, resting, and rainy weather . Symptoms are helped by arthritis medications and movement. Overall disease activity:  ongoing. Limitation on activities include none. The patient reports psoriasis symptoms of thin, dry scales and itching 4/10 (scale 0-10, 10 is the worst itch imaginable), primarily affecting the scalp with the worst portion being on the back of the scalp/neck. Patient denies bleeding. Patient uses tea tree shampoo with mint extract when she has symptoms of psoriasis on her scalp. Lesions appear to be exacerbated by no known precipitant. Treatments tried so far include methotrexate, result oral ulcers and medication discontinuation. · Are you currently having a flare? no  · Considering all the ways in which this condition and others affects you, how are you doing (0 = very well, 10 = very poorly)? 2  · How would you rate your pain on average? (0 = no pain, 10 = worst pain imaginable) 3  · During the past 4 weeks, have you missed any planned activities of daily living due to condition (work/school/other plans)? No  · During the past 4 weeks, have you had to seek urgent care, ER admission, Unplanned doctor office visit, or hospital admission?  No      MEDICATIONS  Current Outpatient Medications   Medication Sig Dispense Refill    Adalimumab (HUMIRA PEN) 40 MG/0.4ML PNKT Inject 40 mg into the skin every 14 days 2 each 5    acetaminophen (TYLENOL) 325 MG tablet Take 650 mg by mouth every 6 hours as needed for Pain      Cholecalciferol (VITAMIN D3) 125 MCG (5000 UT) TABS Take 2 tablets by mouth daily      tiZANidine (ZANAFLEX) 4 MG tablet Take 4 mg by mouth 3 times daily as needed  1    budesonide-formoterol (SYMBICORT) 160-4.5 MCG/ACT AERO Inhaler 2 puffs by mouth (7 minutes apart) two times daily (about every 12 hours)      azelastine (ASTELIN) 0.1 % nasal spray 1 spray by Nasal route 2 times daily Use in each nostril as directed 15 minutes before fluticasone nasal spray. albuterol sulfate HFA (VENTOLIN HFA) 108 (90 Base) MCG/ACT inhaler INHALE 1 PUFFS INTO THE LUNGS 2 TIMES DAILY 18 g 0    celecoxib (CELEBREX) 200 MG capsule Take 200 mg by mouth 2 times daily as needed for Pain        No current facility-administered medications for this visit. Current Specialty Medication: Adalimumab (Humira)   Psoriatic Arthritis: 40 mg every other week  Warnings to monitor for: Anaphylaxis/hypersensitivity reactions, Positive antinuclear antibody titers (even with negative baseline), Demyelinating CNS disease (new-onset or exacerbation), Heart failure (Worsening or new-onset), pancytopenia and aplastic anemia, reactivation of hepatitis B (HBV), Infections: [US Boxed Warning], Malignancy: [US Boxed Warning], Tuberculosis: [US Boxed Warning]  Recommended monitoring: Monitor improvement of symptoms and physical function assessments, signs/symptoms/worsening of heart failure; TB screening (every 6-12 months dependent upon length of therapy and other risk factors), CBC with differential (every 6 weeks for 3 months then annually); LFTs (Annually); HBV screening (annual); signs/symptoms of malignancy (eg, splenomegaly, hepatomegaly, abdominal pain, persistent fever, night sweats, weight loss). Storage: Refrigerated at 36°F to 46°F (2°C to 8°C). DO NOT FREEZE. Do not use if frozen even if it has been thawed. Handling: May store at room temperature (If needed) for up to 14 days with protection from light.  Discard if not used within the 14-day period. (keep track by writing down date removed from refrigerator). Do not use beyond the expiration date on the container. Patient Reported Side Effects: None  Specialty Medication Start Date: ~2016  Appropriate Dose: Yes  Last tuberculin Test: 5/3/22   - Result: negative    Describe your medication adherence over the last 4 weeks: Excellent  How many doses have you missed in the last 4 weeks, if any? 0  How confident are you to follow the injection process and the treatment plan? (0-10) 10 Who injects? Self  Does the patient have a current infection of any kind? No  Contraindications to therapy present? No    Drug Interactions:  No clinically significant interactions identified via [employer] Interaction Analysis as category D or higher.  _____________________________________________________________________  Renal Dosing:  Creatinine Clearance: CrCl cannot be calculated (Unknown ideal weight.). No renal adjustments necessary.     LABS  Lab Results   Component Value Date/Time    BUN 12 08/24/2020 09:04 PM    CREATININE 0.67 03/03/2023 08:45 AM     Lab Results   Component Value Date/Time    WBC 6.6 03/03/2023 08:45 AM    HGB 14.5 03/03/2023 08:45 AM    HCT 44.3 03/03/2023 08:45 AM    RBC 4.96 03/03/2023 08:45 AM     03/03/2023 08:45 AM     Lab Results   Component Value Date/Time    ALKPHOS 52 03/03/2023 08:45 AM    ALT 11 03/03/2023 08:45 AM    AST 15 03/03/2023 08:45 AM    BILITOT 0.26 08/24/2020 09:04 PM       IMMUNIZATIONS  Immunization History   Administered Date(s) Administered    COVID-19, J&J, (age 18y+), IM, 0.5 mL 12/02/2021    Hepatitis B Adult (Engerix-B) 07/09/2018, 08/13/2018, 01/10/2019    Influenza Vaccine, unspecified formulation 02/09/2018, 01/21/2019    Influenza Virus Vaccine 02/01/2019    Influenza, FLUARIX, FLULAVAL, FLUZONE (age 10 mo+) AND AFLURIA, (age 1 y+), PF, 0.5mL 10/04/2019, 10/06/2020, 10/19/2022    Influenza, Quadv, 6 mo and older, IM (Fluzone, Flulaval) 02/09/2018, 01/21/2019    MMR 07/16/2018, 08/13/2018    PPD Test 06/17/2019, 06/08/2020, 05/17/2021, 05/03/2022    Pneumococcal Polysaccharide ([id number]) 06/18/2018    Pneumococcal Vaccine 02/01/2019    Tdap (Boostrix, Adacel) 02/09/2018      Immunization status: missing doses of COVID booster (not interested) [id number], Shingrix. ASSESSMENTS  Fall Risk 3/7/2023 9/13/2022 4/8/2022 5/24/2021   2 or more falls in past year? no no no no   Fall with injury in past year? no no no no     PROMIS V1.1 Global Health 3/7/2023 4/8/2022 5/24/2021   In general, would you say your health is: 4 4 3   In general, would you say your quality of life is: 5 5 4   In general, how would you rate your physical health? 4 4 3   In general, how would you rate your mental health, including your mood and your ability to think? 3 3 3   In general, how would you rate your satisfaction with your social activities and relationships? 3 3 3   In general, please rate how well you carry out your usual social activities and roles. (This includes activities at home, at work and in your community, and responsibilities as a parent, child, spouse, employee, friend, etc.) 4 3 3   To what extent are you able to carry out your everyday physical activities such as walking, climbing stairs, carrying groceries, or moving a chair? 5 5 5   In the past 7 days how often have you been bothered by emotional problems such as feeling anxious, depressed or irritable? 1 3 1   In the past 7 days how would you rate your fatigue on average? 3 5 4   In the past 7 days how would you rate your pain on average? 3 3 5   My BACK PAIN at the moment is - - 2   Mode of Collection - 1 1   Person Completing Survey - 0 0   PROMIS Physical Score 15 17 17   PROMIS Mental Score 12 14 11       Psoriatic Arthritis  Annel Challenger is a 43 y.o. female being treated for Psoriatic Arthritis.   Medication Reconciliation completed (information obtained from patient), no drug-drug interactions identified. Allergy and diagnosis info reviewed and updated. Malorie Acuña has a history remarkable for the following conditions: Psoriatic arthritis, asthma, osteoarthritis  Current therapy includes: Humira 40mg every 14 days, acetaminophen prn, celecoxib 200mg prn  Medication Effectiveness: Patient disease is  well controlled on current therapy. Patient had no questions regarding the medication's warnings, precautions, and contraindications. Confirmed appropriate storage and disposal.  Patient had no concerns with the administration process. Current disease state symptoms include:  Current arthritis symptoms include joint pain in her hands 3/10 (scale 0-10, 10 is the worst pain imaginable), joint swelling in her hands when the weather is bad, fatigue 5/10 (scale 0-10, 10 is the worst fatigue imaginable), and morning stiffness (~5 minutes in her hands) severity. The patient reports psoriasis symptoms of thin, dry scales and itching 4/10 (scale 0-10, 10 is the worst itch imaginable), primarily affecting the scalp with the worst portion being on the back of the scalp/neck. Patient denies bleeding. No side effects/adverse events reported, and no adherence issues identified. Functional and cognitive limitations include: None  Patient is not considered high risk. Based on patient feedback/results of the assessment, the therapy is  still appropriate. No medication-related problem(s) or patient need(s) identified that would require a care plan. Follow up in 180 days     Immunizations  Immunization status: missing doses of COVID booster (not interested) [id number], Shingrix. Patient is agreeable to other vaccines. Follow-up next [facility] visit. Drug Interactions  No clinically significant interactions identified via [employer] Interaction Analysis as category D or higher. Other Identified Potential Issues  Discussed with patient the Pharmacist Collaborative Practice Agreement.  Patient provided verbal and/or electronic (ex. [employer]) consent to participate in the collaborative practice agreement between the pharmacist and referred patient. This is in lieu of paper consent due to COVID-19 precautions and the use of remote/virtual visits. PLAN  Goals of therapy, common side effects, medication storage, and administration reviewed with patient. continue Humira 40mg every 14 days as prescribed   Recommended lab monitoring: None at this time  Recommended vaccinations: COVID booster (not interested) Rdrphzb83, Shingrix. Patient is agreeable to other vaccines. Follow-up next SMS visit. Keep all scheduled appointments. Return to clinic in 6 month(s). or call with any questions or concerns. Bethany Barrientos, [facility]  Ambulatory Clinical Pharmacist  Specialty Medication Services  Phone: [phone]  Fax: [phone]      Al[place] Cabrera was evaluated through a synchronous (real-time) audio encounter. Patient identification was verified at the start of the visit. She (or guardian if applicable) is aware that this is a billable service, which includes applicable co-pays. This visit was conducted with the patient's (and/or legal guardian's) verbal consent. She has not had a related appointment within my department in the past 7 days or scheduled within the next 24 hours. The patient was located at Home: [address] [place] [id number]. The provider was located at Home ([place] [address]): [place].     Note: not billable if this call serves to triage the patient into an appointment for the relevant concern    Loal Hwang, [address] [place] [address]    Program: [facility]  CPA in place:  No  Recommendation Provided To: Patient/Caregiver: 3 via Virtual Visit  Intervention Detail: Scheduled Appointment and Vaccine Recommended/Administered  Intervention Accepted By: Patient/Caregiver: 3  Time Spent (min):  90

## 2023-03-06 NOTE — TELEPHONE ENCOUNTER
Specialty Medication Service    Date: 3/6/2023  Patient's Name: Adama Galeana YOB: 1980            _____________________________________________________________________________________________    Fabienne Fuelling patient's specialist office to request most recent office visit summary and relevant labs for Specialty Medication Service formulary medication. Talked to representative in specialist's office to have faxed once their computer issues are resolved.     Ellen Mata, PharmD [facility]  Ambulatory Clinical Pharmacist  Specialty Medication Services  Phone: [phone]  Fax: [phone]    For Pharmacy Admin Tracking Only    Program: SMS  CPA in place:  No  Recommendation Provided To: Provider: 1 via Called provider office  Intervention Accepted By: Provider: 1  Time Spent (min): 15

## 2023-03-07 ENCOUNTER — PHARMACY VISIT (OUTPATIENT)
Dept: INTERNAL MEDICINE | Age: 43
End: 2023-03-07

## 2023-03-07 DIAGNOSIS — L40.50 PSORIATIC ARTHRITIS (HCC): Primary | ICD-10-CM

## 2023-03-07 PROBLEM — H92.09 OTALGIA: Status: ACTIVE | Noted: 2019-08-22

## 2023-03-07 ASSESSMENT — PROMIS GLOBAL HEALTH SCALE
IN GENERAL, PLEASE RATE HOW WELL YOU CARRY OUT YOUR USUAL SOCIAL ACTIVITIES (INCLUDES ACTIVITIES AT HOME, AT WORK, AND IN YOUR COMMUNITY, AND RESPONSIBILITIES AS A PARENT, CHILD, SPOUSE, EMPLOYEE, FRIEND, ETC) [ON A SCALE OF 1 (POOR) TO 5 (EXCELLENT)]?: 4
IN THE PAST 7 DAYS, HOW WOULD YOU RATE YOUR PAIN ON AVERAGE [ON A SCALE FROM 0 (NO PAIN) TO 10 (WORST IMAGINABLE PAIN)]?: 3
IN THE PAST 7 DAYS, HOW OFTEN HAVE YOU BEEN BOTHERED BY EMOTIONAL PROBLEMS, SUCH AS FEELING ANXIOUS, DEPRESSED, OR IRRITABLE [ON A SCALE FROM 1 (NEVER) TO 5 (ALWAYS)]?: 1
IN GENERAL, WOULD YOU SAY YOUR QUALITY OF LIFE IS...[ON A SCALE OF 1 (POOR) TO 5 (EXCELLENT)]: 5
IN GENERAL, HOW WOULD YOU RATE YOUR PHYSICAL HEALTH [ON A SCALE OF 1 (POOR) TO 5 (EXCELLENT)]?: 4
IN GENERAL, HOW WOULD YOU RATE YOUR MENTAL HEALTH, INCLUDING YOUR MOOD AND YOUR ABILITY TO THINK [ON A SCALE OF 1 (POOR) TO 5 (EXCELLENT)]?: 3
IN GENERAL, HOW WOULD YOU RATE YOUR SATISFACTION WITH YOUR SOCIAL ACTIVITIES AND RELATIONSHIPS [ON A SCALE OF 1 (POOR) TO 5 (EXCELLENT)]?: 3
IN THE PAST 7 DAYS, HOW WOULD YOU RATE YOUR FATIGUE ON AVERAGE [ON A SCALE FROM 1 (NONE) TO 5 (VERY SEVERE)]?: 3
SUM OF RESPONSES TO QUESTIONS 3, 6, 7, & 8: 15
IN GENERAL, WOULD YOU SAY YOUR HEALTH IS...[ON A SCALE OF 1 (POOR) TO 5 (EXCELLENT)]: 4
SUM OF RESPONSES TO QUESTIONS 2, 4, 5, & 10: 12
TO WHAT EXTENT ARE YOU ABLE TO CARRY OUT YOUR EVERYDAY PHYSICAL ACTIVITIES SUCH AS WALKING, CLIMBING STAIRS, CARRYING GROCERIES, OR MOVING A CHAIR [ON A SCALE OF 1 (NOT AT ALL) TO 5 (COMPLETELY)]?: 5

## 2023-03-07 ASSESSMENT — ROUTINE ASSESSMENT OF PATIENT INDEX DATA (RAPID3)
TOTAL RAPID3 SCORE: 5
ON A SCALE OF ONE TO TEN, HOW MUCH PAIN HAVE YOU HAD BECAUSE OF YOUR CONDITION OVER THE PAST WEEK?: 3
TOTAL RAPID3 SCORE: 5
ON A SCALE OF ONE TO TEN, HOW DIFFICULT WAS IT FOR YOU TO COMPLETE THE LISTED DAILY PHYSICAL TASKS OVER THE LAST WEEK: 1.67
ON A SCALE OF ONE TO TEN, CONSIDERING ALL THE WAYS IN WHICH ILLNESS AND HEALTH CONDITIONS MAY AFFECT YOU AT THIS TIME, PLEASE INDICATE BELOW HOW YOU ARE DOING:: 2

## 2023-03-07 NOTE — Clinical Note
Manjinder Hensley. Completed pharmacy review for SMS visit. Patient is continuing on Humira for Psoriatic Arthritis. No recommendations for therapy at this time. Patient is recommended for COVID booster (not interested), [id number], and Shingrix vaccination.   Thank you, Nahid Hunter, PharmD [facility] Ambulatory Clinical Pharmacist Specialty Medication Services Phone: [phone] Fax: [phone]

## 2023-03-07 NOTE — Clinical Note
Just reprint the referral and fax to them, go in and change back to authorized for the referral Manjinder Tipton. Completed pharmacy review for SMS visit. Patient is continuing on Humira for Psoriatic Arthritis. No recommendations for therapy at this time. Patient is recommended for COVID booster (not interested), Ejjmdsq56, and Shingrix vaccination.   Thank you, Rene Haddad, PharmD [address] Ambulatory Clinical Pharmacist Specialty Medication Services Phone: [phone] Fax: [phone]

## 2023-03-29 DIAGNOSIS — L40.50 PSORIATIC ARTHRITIS (HCC): ICD-10-CM

## 2023-03-30 ENCOUNTER — PHARMACY VISIT (OUTPATIENT)
Dept: INTERNAL MEDICINE | Age: 43
End: 2023-03-30

## 2023-03-30 DIAGNOSIS — L40.50 PSORIATIC ARTHRITIS (HCC): Primary | ICD-10-CM

## 2023-03-30 ASSESSMENT — ENCOUNTER SYMPTOMS
RESPIRATORY NEGATIVE: 1
GASTROINTESTINAL NEGATIVE: 1
EYES NEGATIVE: 1

## 2023-03-30 NOTE — PROGRESS NOTES
Specialty Medication Follow up Virtual Visit  [address] [address]  Dept: [phone]  Dept Fax: [phone]  Date of patient's visit: 3/30/2023  Patient's Name:  Harsh Mchugh YOB: 1980            Patient Care Team:  Carina Bowers MD as PCP - General (Family Medicine)  Mj Díaz MD as PCP - Empaneled Provider  Carlos Cabrera MD as Consulting Physician (Obstetrics & Gynecology)  Jessica Parker MD as Obstetrician (Perinatology)  ================================================================    REASON FOR VISIT/CHIEF COMPLAINT:  Discuss Medications    HISTORY OF PRESENTING ILLNESS:  History was obtained from: patient. Harsh Mchugh is 43 y.o. is here for a follow up virtual visit for specialty medication. Specialty Medication: Humira 40MG/0.4ML Pen   Frequency: Every 14 days  Indication: Psoriatic arthritis/Psoriasis  Initially Diagnosed: ~2016  Additional Therapy:   Celecoxib  Acetaminophen  Tea tree shampoo  Previous Therapy:   Methotrexate + Folic acid (discontinued d/t oral ulcers, previously planning for pregnancy)      Specialist:   Alina Wright MD  [facility] [address]  [phone]  Specialist Progress Note Available: No, previously requested on 3/6/23 via phone, did not receive. Last Specialist Visit: ~ 2 weeks ago. Continue current medications    Side effects includes: None    Harsh Mchugh has no new complain today. Has very minimal psoriatic plaque in scalp. Otherwise no lesions. Arthritis is well controlled overall. No swollen joints or recent flares.      DIAGNOSTIC FINDINGS:  CBC:  Lab Results   Component Value Date/Time    WBC 6.6 03/03/2023 08:45 AM    HGB 14.5 03/03/2023 08:45 AM     03/03/2023 08:45 AM     BMP:    Lab Results   Component Value Date/Time     08/24/2020 09:04 PM    K 4.3

## 2023-03-31 RX ORDER — ADALIMUMAB 40MG/0.4ML
40 KIT SUBCUTANEOUS
Qty: 2 EACH | Refills: 5 | Status: SHIPPED | OUTPATIENT
Start: 2023-03-31

## 2023-03-31 NOTE — TELEPHONE ENCOUNTER
Specialty Medication Service    Date: 3/31/2023  Patient's Name: Juanis Hebert YOB: 1980            _____________________________________________________________________________________________    Date: 3/31/2023  Patient's Name: Juanis Hebert    YOB: 1980            Refill request received from  [facility] for Humira. Patient last seen by [facility] 3/7/23. Labs are up to date. CPA on file, will send new SMS prescription.        Ehsan Olivas PharmD [facility]  Ambulatory Clinical Pharmacist  Specialty Medication Services  Phone: [phone]  Fax: [phone]    For Pharmacy Admin Tracking Only    Program: SMS  CPA in place:  Yes  Recommendation Provided To: Patient/Caregiver: 1 via Telephone  Intervention Detail: Refill(s) Provided  Intervention Accepted By: Patient/Caregiver: 1  Time Spent (min): 15

## 2023-05-03 DIAGNOSIS — L40.50 PSORIATIC ARTHRITIS (HCC): ICD-10-CM

## 2023-05-03 RX ORDER — ADALIMUMAB 40MG/0.4ML
40 KIT SUBCUTANEOUS
Qty: 2 EACH | Refills: 4 | Status: SHIPPED | OUTPATIENT
Start: 2023-05-03

## 2023-05-03 NOTE — TELEPHONE ENCOUNTER
Specialty Medication Service    Date: 5/3/2023  Patient's Name: Guillermo Moura YOB: 1980            _____________________________________________________________________________________________    Guillermo Moura is a 43 y.o. female enrolled in the Specialty Medication Service. We received a refill request for Humira on 5/3/2023. Original Rx was written for 1+1 fills on 3/29/2023.      Thank you,    Fabiana Maciel      Requested Prescriptions     Pending Prescriptions Disp Refills    Adalimumab (HUMIRA PEN) 40 MG/0.4ML PNKT 2 each 1     Sig: Inject 40 mg into the skin every 14 days

## 2023-05-03 NOTE — TELEPHONE ENCOUNTER
Specialty Medication Service    Date: 5/3/2023  Patient's Name: Meghann Pino YOB: 1980            _____________________________________________________________________________________________    Date: 5/3/2023  Patient's Name: Meghann Pino    YOB: 1980            Refill request received from  [facility]. [facility] for Humira. Patient last seen by [facility] 3/7/23. Labs are up to date. CPA on file, will send new SMS prescription.        Nicole Johnston, PharmD [facility]  Ambulatory Clinical Pharmacist  Specialty Medication Services  Phone: [phone]  Fax: [phone]    For Pharmacy Admin Tracking Only    Program: SMS  CPA in place:  Yes  Recommendation Provided To: Patient/Caregiver: 1 via Telephone  Intervention Detail: Refill(s) Provided  Intervention Accepted By: Patient/Caregiver: 1  Time Spent (min): 15

## 2023-07-27 ENCOUNTER — HOSPITAL ENCOUNTER (OUTPATIENT)
Age: 43
Discharge: HOME | End: 2023-07-27
Payer: COMMERCIAL

## 2023-07-27 LAB
ALBUMIN SERPL-MCNC: 4 G/DL (ref 3.5–5.2)
ALP SERPL-CCNC: 53 U/L (ref 35–104)
ALT SERPL-CCNC: 12 U/L (ref 5–33)
AST SERPL-CCNC: 18 U/L
CALCIUM SERPL-MCNC: 9.1 MG/DL (ref 8.6–10.4)
CREAT SERPL-MCNC: 0.9 MG/DL (ref 0.5–0.9)
CRP SERPL HS-MCNC: 10.3 MG/L (ref 0–5)
ERYTHROCYTE [DISTWIDTH] IN BLOOD BY AUTOMATED COUNT: 12.8 % (ref 11.8–14.4)
ERYTHROCYTE [SEDIMENTATION RATE] IN BLOOD BY PHOTOMETRIC METHOD: 23 MM/HR (ref 0–20)
GFR SERPL CREATININE-BSD FRML MDRD: >60 ML/MIN/1.73M2
HCT VFR BLD AUTO: 43.3 % (ref 36.3–47.1)
HGB BLD-MCNC: 14.4 G/DL (ref 11.9–15.1)
MCH RBC QN AUTO: 29.1 PG (ref 25.2–33.5)
MCHC RBC AUTO-ENTMCNC: 33.3 G/DL (ref 28.4–34.8)
MCV RBC AUTO: 87.5 FL (ref 82.6–102.9)
NRBC BLD-RTO: 0 PER 100 WBC
PLATELET # BLD AUTO: 321 K/UL (ref 138–453)
PMV BLD AUTO: 11.1 FL (ref 8.1–13.5)
RBC # BLD AUTO: 4.95 M/UL (ref 3.95–5.11)
WBC OTHER # BLD: 6.8 K/UL (ref 3.5–11.3)

## 2023-07-27 PROCEDURE — 82040 ASSAY OF SERUM ALBUMIN: CPT

## 2023-07-27 PROCEDURE — 36415 COLL VENOUS BLD VENIPUNCTURE: CPT

## 2023-07-27 PROCEDURE — 86140 C-REACTIVE PROTEIN: CPT

## 2023-07-27 PROCEDURE — 84075 ASSAY ALKALINE PHOSPHATASE: CPT

## 2023-07-27 PROCEDURE — 84450 TRANSFERASE (AST) (SGOT): CPT

## 2023-07-27 PROCEDURE — 82310 ASSAY OF CALCIUM: CPT

## 2023-07-27 PROCEDURE — 84460 ALANINE AMINO (ALT) (SGPT): CPT

## 2023-07-27 PROCEDURE — 82565 ASSAY OF CREATININE: CPT

## 2023-07-27 PROCEDURE — 85027 COMPLETE CBC AUTOMATED: CPT

## 2023-07-27 PROCEDURE — 85652 RBC SED RATE AUTOMATED: CPT

## 2023-08-31 ENCOUNTER — TELEPHONE (OUTPATIENT)
Dept: INTERNAL MEDICINE | Age: 43
End: 2023-08-31

## 2023-08-31 RX ORDER — FOLIC ACID 1 MG/1
1 TABLET ORAL DAILY
COMMUNITY
Start: 2023-08-22

## 2023-08-31 NOTE — TELEPHONE ENCOUNTER
Specialty Medication Service    Date: 8/31/2023  Patient's Name: [facility] YOB: 1980            _____________________________________________________________________________________________    Butch Tomaskia patient's specialist office to request most recent office visit summary and relevant labs for Specialty Medication Service formulary medication. Talked to representative in specialist's office to have faxed to [phone].      Daniel Orona, PharmD [facility]  Ambulatory Clinical Pharmacist  Specialty Medication Services  Phone: [phone]  Fax: [phone]    For Pharmacy Admin Tracking Only    Program: SMS  CPA in place:  Yes  Recommendation Provided To: Provider: 1 via Called provider office  Intervention Accepted By: Provider: 1  Time Spent (min): 15 Home alone

## 2023-08-31 NOTE — TELEPHONE ENCOUNTER
Specialty Medication Service    Date: 8/31/2023  Patient's Name: Carlton East YOB: 1980            _____________________________________________________________________________________________    Inbound fax received from external provider containing patient medical records requested by [employer]. Patient identifiers confirmed on each page to ensure accuracy and protection of privacy. Imported into the chart media, PharmD aware notes are available for viewing.     Tay Chowdhury [facility]  Pharmacy   Specialty Medication Services   Phone: [phone] option 4    For Pharmacy Admin Tracking Only    Program: [employer]  CPA in place:  Yes  Recommendation Provided To: Provider: 1 via Called provider office  Intervention Accepted By: Provider: 1   Time Spent (min): 10

## 2023-08-31 NOTE — PROGRESS NOTES
Specialty Medication Service    Date: 9/12/2023  Patient's Name: Jade Post YOB: 1980            _____________________________________________________________________________________________    Patient no longer has [facility]) benefits (termed 9/1/2023). Patient is no longer enrolled in SMS program. Reached patient to explain they will no longer be eligible for SMS services and that we will be discharging them from the program.  Informed patient future SMS appointments will be canceled and no further outreach planned. Patient instructed to contact [address] [place] [address] ([phone]) to provide updated insurance information for continuity in care if possible. In addition, mailed letter/sent [employer] message (if applicable), updated SMS spreadsheet, deactivated any current SMS prescriptions and updated [employer] as well as alerted the pharmacy.      Aisha Roberts, PharmD [facility]  Ambulatory Clinical Pharmacist  Specialty Medication Services  Phone: [phone]  Fax: [phone]    For Pharmacy Admin Tracking Only    Program: SMS  CPA in place:  No  Recommendation Provided To: Patient/Caregiver: 1 via Telephone  Intervention Detail: Benefit Assistance  Intervention Accepted By: Patient/Caregiver: 1  Time Spent (min): 45

## 2023-09-12 ENCOUNTER — PHARMACY VISIT (OUTPATIENT)
Dept: INTERNAL MEDICINE | Age: 43
End: 2023-09-12

## 2023-09-12 DIAGNOSIS — L40.50 PSORIATIC ARTHRITIS (HCC): Primary | ICD-10-CM

## 2023-09-12 PROCEDURE — 99999 PR OFFICE/OUTPT VISIT,PROCEDURE ONLY: CPT | Performed by: PHARMACIST
